# Patient Record
Sex: MALE | Race: WHITE | Employment: OTHER | URBAN - METROPOLITAN AREA
[De-identification: names, ages, dates, MRNs, and addresses within clinical notes are randomized per-mention and may not be internally consistent; named-entity substitution may affect disease eponyms.]

---

## 2017-01-13 ENCOUNTER — PROCEDURE VISIT (OUTPATIENT)
Dept: DERMATOLOGY | Age: 72
End: 2017-01-13

## 2017-01-13 VITALS — DIASTOLIC BLOOD PRESSURE: 76 MMHG | SYSTOLIC BLOOD PRESSURE: 121 MMHG | HEART RATE: 52 BPM

## 2017-01-13 DIAGNOSIS — C44.712 BCC (BASAL CELL CARCINOMA), LEG, RIGHT: ICD-10-CM

## 2017-01-13 DIAGNOSIS — C44.519 BCC (BASAL CELL CARCINOMA), TRUNK: Primary | ICD-10-CM

## 2017-01-13 PROCEDURE — 12032 INTMD RPR S/A/T/EXT 2.6-7.5: CPT | Performed by: DERMATOLOGY

## 2017-01-13 PROCEDURE — 11603 EXC TR-EXT MAL+MARG 2.1-3 CM: CPT | Performed by: DERMATOLOGY

## 2017-01-13 PROCEDURE — 17262 DSTRJ MAL LES T/A/L 1.1-2.0: CPT | Performed by: DERMATOLOGY

## 2017-01-18 ENCOUNTER — TELEPHONE (OUTPATIENT)
Dept: DERMATOLOGY | Age: 72
End: 2017-01-18

## 2017-01-27 ENCOUNTER — NURSE ONLY (OUTPATIENT)
Dept: DERMATOLOGY | Age: 72
End: 2017-01-27

## 2017-01-27 DIAGNOSIS — Z48.02 VISIT FOR SUTURE REMOVAL: Primary | ICD-10-CM

## 2017-02-27 ENCOUNTER — OFFICE VISIT (OUTPATIENT)
Dept: DERMATOLOGY | Age: 72
End: 2017-02-27

## 2017-02-27 DIAGNOSIS — L57.0 AK (ACTINIC KERATOSIS): Primary | ICD-10-CM

## 2017-02-27 DIAGNOSIS — L11.1 GROVER'S DISEASE: ICD-10-CM

## 2017-02-27 DIAGNOSIS — D48.5 NEOPLASM OF UNCERTAIN BEHAVIOR OF SKIN: ICD-10-CM

## 2017-02-27 PROCEDURE — 99213 OFFICE O/P EST LOW 20 MIN: CPT | Performed by: DERMATOLOGY

## 2017-02-27 PROCEDURE — 11101 PR BIOPSY, EACH ADDED LESION: CPT | Performed by: DERMATOLOGY

## 2017-02-27 PROCEDURE — 17000 DESTRUCT PREMALG LESION: CPT | Performed by: DERMATOLOGY

## 2017-02-27 PROCEDURE — 17003 DESTRUCT PREMALG LES 2-14: CPT | Performed by: DERMATOLOGY

## 2017-02-27 PROCEDURE — 11100 PR BIOPSY OF SKIN LESION: CPT | Performed by: DERMATOLOGY

## 2017-03-02 ENCOUNTER — TELEPHONE (OUTPATIENT)
Dept: DERMATOLOGY | Age: 72
End: 2017-03-02

## 2017-03-02 DIAGNOSIS — C44.319 BASAL CELL CARCINOMA OF FOREHEAD: Primary | ICD-10-CM

## 2017-03-28 ENCOUNTER — PROCEDURE VISIT (OUTPATIENT)
Dept: SURGERY | Age: 72
End: 2017-03-28

## 2017-03-28 VITALS
WEIGHT: 235 LBS | HEART RATE: 60 BPM | TEMPERATURE: 98.3 F | SYSTOLIC BLOOD PRESSURE: 130 MMHG | DIASTOLIC BLOOD PRESSURE: 83 MMHG | OXYGEN SATURATION: 94 %

## 2017-03-28 DIAGNOSIS — C44.319 BASAL CELL CARCINOMA OF RIGHT FOREHEAD: Primary | ICD-10-CM

## 2017-03-28 DIAGNOSIS — C44.319 BASAL CELL CARCINOMA OF RIGHT FOREHEAD: ICD-10-CM

## 2017-03-28 PROCEDURE — 13132 CMPLX RPR F/C/C/M/N/AX/G/H/F: CPT | Performed by: DERMATOLOGY

## 2017-03-28 PROCEDURE — 17311 MOHS 1 STAGE H/N/HF/G: CPT | Performed by: DERMATOLOGY

## 2017-03-29 ENCOUNTER — TELEPHONE (OUTPATIENT)
Dept: SURGERY | Age: 72
End: 2017-03-29

## 2017-09-12 ENCOUNTER — OFFICE VISIT (OUTPATIENT)
Dept: DERMATOLOGY | Age: 72
End: 2017-09-12

## 2017-09-12 DIAGNOSIS — D48.5 NEOPLASM OF UNCERTAIN BEHAVIOR OF SKIN: ICD-10-CM

## 2017-09-12 DIAGNOSIS — L82.1 SK (SEBORRHEIC KERATOSIS): ICD-10-CM

## 2017-09-12 DIAGNOSIS — Z85.828 HISTORY OF NONMELANOMA SKIN CANCER: ICD-10-CM

## 2017-09-12 DIAGNOSIS — L57.0 AK (ACTINIC KERATOSIS): Primary | ICD-10-CM

## 2017-09-12 PROCEDURE — 17003 DESTRUCT PREMALG LES 2-14: CPT | Performed by: DERMATOLOGY

## 2017-09-12 PROCEDURE — 17000 DESTRUCT PREMALG LESION: CPT | Performed by: DERMATOLOGY

## 2017-09-12 PROCEDURE — 11100 PR BIOPSY OF SKIN LESION: CPT | Performed by: DERMATOLOGY

## 2017-09-12 PROCEDURE — 99213 OFFICE O/P EST LOW 20 MIN: CPT | Performed by: DERMATOLOGY

## 2017-09-12 PROCEDURE — 11101 PR BIOPSY, EACH ADDED LESION: CPT | Performed by: DERMATOLOGY

## 2017-09-15 ENCOUNTER — TELEPHONE (OUTPATIENT)
Dept: DERMATOLOGY | Age: 72
End: 2017-09-15

## 2017-10-06 ENCOUNTER — OFFICE VISIT (OUTPATIENT)
Dept: DERMATOLOGY | Age: 72
End: 2017-10-06

## 2017-10-06 DIAGNOSIS — C44.719 BCC (BASAL CELL CARCINOMA), LEG, LEFT: ICD-10-CM

## 2017-10-06 DIAGNOSIS — C44.519 BCC (BASAL CELL CARCINOMA), TRUNK: Primary | ICD-10-CM

## 2017-10-06 PROCEDURE — 17261 DSTRJ MAL LES T/A/L .6-1.0CM: CPT | Performed by: DERMATOLOGY

## 2017-10-06 NOTE — PATIENT INSTRUCTIONS
Wound Care Instructions    · Keep the bandage in place for 24 hours. · Cleanse the wound with mild soapy water daily   Gently dry the area.  Apply Vaseline or petroleum jelly to the wound using a cotton tipped applicator.  Cover with a clean bandage.  Repeat this process until the site is healed.    You may shower and bathe as usual.

## 2017-10-06 NOTE — PROGRESS NOTES
[facility] Dermatology  Kimberlee Gupta MD  [phone]      Zayra Renteria  1945    67 y.o. male     Date of Visit: 10/6/2017    Chief Complaint: BCCs    History of Present Illness:    Here today for treatment of 2 superficial basal cell carcinomas on the right lower back and left medial lower leg. Review of Systems:  None. Past Medical History, Family History, Surgical History, Medications and Allergies reviewed. Past Medical History:   Diagnosis Date    Hyperlipidemia     Hypertension      Past Surgical History:   Procedure Laterality Date    FRACTURE SURGERY      wrist       No Known Allergies  Outpatient Prescriptions Marked as Taking for the 10/6/17 encounter (Office Visit) with Justin Oseguera MD   Medication Sig Dispense Refill    atorvastatin (LIPITOR) 20 MG tablet       lisinopril (PRINIVIL;ZESTRIL) 10 MG tablet       aspirin 81 MG tablet Take 81 mg by mouth daily           Physical Examination       Well appearing. 1.  Right lower back - 7 mm oval shaped pink macule. 2.  Left medial lower leg - 1 cm oval shaped pink patch. Assessment and Plan     1. BCC (basal cell carcinoma), right lower back - 7 mm    The area to be treated with cleansed with alcohol and marked with surgical marking pen. Local anesthesia was acheived with 1% lidocaine with epinephrine. Sharp curettage was performed in 3 different directions. Hemostasis was obtained with aluminum chloride. The wound was dressed with petrolatum and a bandage. Patient was educated regarding risks including bleeding, discomfort, and risk of recurrence. 2. BCC (basal cell carcinoma), left medial lower leg - 1 cm    The area to be treated with cleansed with alcohol and marked with surgical marking pen. Local anesthesia was acheived with 1% lidocaine with epinephrine. Sharp curettage was performed in 3 different directions. Hemostasis was obtained with aluminum chloride.  The wound was dressed with petrolatum and a bandage. Patient was educated regarding risks including bleeding, discomfort, and risk of recurrence.

## 2017-10-06 NOTE — MR AVS SNAPSHOT
After Visit Summary             Miriam Spurling   10/6/2017 8:45 AM   Office Visit    Description:  Male : 1945   Provider:  Alecia Pittman MD   Department:  [facility]              Your Follow-Up and Future Appointments         Below is a list of your follow-up and future appointments. This may not be a complete list as you may have made appointments directly with providers that we are not aware of or your providers may have made some for you. Please call your providers to confirm appointments. It is important to keep your appointments. Please bring your current insurance card, photo ID, co-pay, and all medication bottles to your appointment. If self-pay, payment is expected at the time of service. Information from Your Visit        Department     Name Address Phone Fax    [facility] [address] [address] [phone]      You Were Seen for:         Comments    BCC (basal cell carcinoma), trunk   [[id number]]         Vital Signs     Smoking Status                   Never Smoker           Instructions    Wound Care Instructions    · Keep the bandage in place for 24 hours. · Cleanse the wound with mild soapy water daily  ? Gently dry the area. ? Apply Vaseline or petroleum jelly to the wound using a cotton tipped applicator. ? Cover with a clean bandage. ? Repeat this process until the site is healed. ? You may shower and bathe as usual.                 Medications and Orders      Your Current Medications Are              atorvastatin (LIPITOR) 20 MG tablet     lisinopril (PRINIVIL;ZESTRIL) 10 MG tablet     aspirin 81 MG tablet Take 81 mg by mouth daily    fluorouracil (EFUDEX) 5 % cream Apply twice daily to the forehead and top of the scalp for 14 days.       Allergies           No Known Allergies         Additional Information        Basic Information     Date Of Birth Sex Race Ethnicity Preferred Language

## 2018-04-26 ENCOUNTER — OFFICE VISIT (OUTPATIENT)
Dept: DERMATOLOGY | Age: 73
End: 2018-04-26

## 2018-04-26 DIAGNOSIS — L57.0 AK (ACTINIC KERATOSIS): ICD-10-CM

## 2018-04-26 DIAGNOSIS — L82.1 SK (SEBORRHEIC KERATOSIS): Primary | ICD-10-CM

## 2018-04-26 DIAGNOSIS — Z85.828 HISTORY OF BASAL CELL CARCINOMA: ICD-10-CM

## 2018-04-26 PROCEDURE — 4040F PNEUMOC VAC/ADMIN/RCVD: CPT | Performed by: DERMATOLOGY

## 2018-04-26 PROCEDURE — G8427 DOCREV CUR MEDS BY ELIG CLIN: HCPCS | Performed by: DERMATOLOGY

## 2018-04-26 PROCEDURE — 1123F ACP DISCUSS/DSCN MKR DOCD: CPT | Performed by: DERMATOLOGY

## 2018-04-26 PROCEDURE — 99213 OFFICE O/P EST LOW 20 MIN: CPT | Performed by: DERMATOLOGY

## 2018-04-26 PROCEDURE — 1036F TOBACCO NON-USER: CPT | Performed by: DERMATOLOGY

## 2018-04-26 PROCEDURE — G8421 BMI NOT CALCULATED: HCPCS | Performed by: DERMATOLOGY

## 2018-04-26 PROCEDURE — 3017F COLORECTAL CA SCREEN DOC REV: CPT | Performed by: DERMATOLOGY

## 2018-04-26 PROCEDURE — 17000 DESTRUCT PREMALG LESION: CPT | Performed by: DERMATOLOGY

## 2018-04-26 PROCEDURE — 17003 DESTRUCT PREMALG LES 2-14: CPT | Performed by: DERMATOLOGY

## 2018-11-06 ENCOUNTER — OFFICE VISIT (OUTPATIENT)
Dept: DERMATOLOGY | Age: 73
End: 2018-11-06
Payer: MEDICARE

## 2018-11-06 DIAGNOSIS — D48.5 NEOPLASM OF UNCERTAIN BEHAVIOR OF SKIN: ICD-10-CM

## 2018-11-06 DIAGNOSIS — L57.0 AK (ACTINIC KERATOSIS): Primary | ICD-10-CM

## 2018-11-06 PROCEDURE — 11100 PR BIOPSY OF SKIN LESION: CPT | Performed by: DERMATOLOGY

## 2018-11-06 PROCEDURE — 17000 DESTRUCT PREMALG LESION: CPT | Performed by: DERMATOLOGY

## 2018-11-06 PROCEDURE — 11101 PR BIOPSY, EACH ADDED LESION: CPT | Performed by: DERMATOLOGY

## 2018-11-06 PROCEDURE — 17003 DESTRUCT PREMALG LES 2-14: CPT | Performed by: DERMATOLOGY

## 2018-11-08 LAB — DERMATOLOGY PATHOLOGY REPORT: ABNORMAL

## 2018-12-07 ENCOUNTER — OFFICE VISIT (OUTPATIENT)
Dept: DERMATOLOGY | Age: 73
End: 2018-12-07
Payer: MEDICARE

## 2018-12-07 DIAGNOSIS — C44.519 BCC (BASAL CELL CARCINOMA), TRUNK: ICD-10-CM

## 2018-12-07 DIAGNOSIS — D04.5 CARCINOMA IN SITU OF SKIN OF TRUNK: Primary | ICD-10-CM

## 2018-12-07 PROCEDURE — 17262 DSTRJ MAL LES T/A/L 1.1-2.0: CPT | Performed by: DERMATOLOGY

## 2018-12-07 PROCEDURE — 17261 DSTRJ MAL LES T/A/L .6-1.0CM: CPT | Performed by: DERMATOLOGY

## 2018-12-07 NOTE — PROGRESS NOTES
was acheived with 1% lidocaine with epinephrine. Sharp curettage was performed in 3 different directions. Hemostasis was obtained with aluminum chloride. The wound was dressed with petrolatum and a bandage. Patient was educated regarding risks including bleeding, discomfort, and risk of recurrence.

## 2019-05-09 ENCOUNTER — OFFICE VISIT (OUTPATIENT)
Dept: DERMATOLOGY | Age: 74
End: 2019-05-09
Payer: MEDICARE

## 2019-05-09 DIAGNOSIS — L57.0 ACTINIC KERATOSIS: ICD-10-CM

## 2019-05-09 DIAGNOSIS — Z85.828 HISTORY OF NONMELANOMA SKIN CANCER: ICD-10-CM

## 2019-05-09 DIAGNOSIS — L82.1 SK (SEBORRHEIC KERATOSIS): Primary | ICD-10-CM

## 2019-05-09 PROCEDURE — 17000 DESTRUCT PREMALG LESION: CPT | Performed by: DERMATOLOGY

## 2019-05-09 PROCEDURE — 17003 DESTRUCT PREMALG LES 2-14: CPT | Performed by: DERMATOLOGY

## 2019-05-09 PROCEDURE — 1123F ACP DISCUSS/DSCN MKR DOCD: CPT | Performed by: DERMATOLOGY

## 2019-05-09 PROCEDURE — 4040F PNEUMOC VAC/ADMIN/RCVD: CPT | Performed by: DERMATOLOGY

## 2019-05-09 PROCEDURE — G8421 BMI NOT CALCULATED: HCPCS | Performed by: DERMATOLOGY

## 2019-05-09 PROCEDURE — 3017F COLORECTAL CA SCREEN DOC REV: CPT | Performed by: DERMATOLOGY

## 2019-05-09 PROCEDURE — 99213 OFFICE O/P EST LOW 20 MIN: CPT | Performed by: DERMATOLOGY

## 2019-05-09 PROCEDURE — G8427 DOCREV CUR MEDS BY ELIG CLIN: HCPCS | Performed by: DERMATOLOGY

## 2019-05-09 PROCEDURE — 1036F TOBACCO NON-USER: CPT | Performed by: DERMATOLOGY

## 2019-05-09 NOTE — PATIENT INSTRUCTIONS
Cryosurgery (Freezing) Wound Care Instructions    AFTER THE PROCEDURE:    You will notice swelling and redness around the site. This is normal.    You may experience a sharp or sore feeling for the next several days. For this discomfort, you may take acetaminophen (Tylenol©).  A blister may develop at the treated area, sometimes as soon as by the end of the day. After several days, the blister will subside and a scab will form.  If the area is bumped or traumatized during the first few days following freezing, you may develop bleeding into the blister, forming a blood blister. This is nothing to be alarmed about.  If the blister is tense, uncomfortable, or much larger than the site that was frozen, you may pop the blister along its edge with a sterile needle (boiled, heated under a flame, or cleaned with alcohol) to allow the fluid to drain out. If the blister does not bother you, no treatment is needed.  Do NOT peel off the top of the blister roof. It will act as a dressing on top of your wound. WOUND CARE:    You may shower or bathe as usual, but avoid scrubbing the areas that have been frozen.  Cleanse the site twice a day with mild soapy water, and then apply a thin film of white petrolatum (Vaseline©).  You do not need to cover the area, but can if you prefer.  Do NOT allow the site to become dry or crusted, or attempt to dry it out with rubbing alcohol or hydrogen peroxide.  Continue this regimen until the area is pink and healed. Depending on the size and location of your cryosurgery site, healing may take 2 to 4 weeks.  The area may continue to be pink for several weeks, and over the next few months may become darker or lighter than the surrounding skin. This may be a permanent change.    

## 2019-05-09 NOTE — PROGRESS NOTES
[facility]  Esdras Pinto MD  [phone]      Starla Chandra  1945    75 y.o. male     Date of Visit: 5/9/2019    Chief Complaint: skin lesions    History of Present Illness:    1. He complains of an asymptomatic lesion below the right eye.     2.  F/u for a hx of AKs - has several new lesions today on the scalp and face. 3.  Has hx of skin cancers - denies any signs of recurrence. 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 Superficial BCC on the right lower back and left medial lower leg both treated with curettage on 10/6/2017. Infiltrating BCC of the right central forehead-treated with Mohs by Dr. Libertad Villa on 3/28/2017. Nodular BCC of the right upper forehead-treated with Mohs by Dr. Libertad Villa on 3/28/2017.     Nodular BCC of the right mid upper back-excised on 1/13/2017. Superficial BCC of the right distal posterior thigh-treated with curettage on 1/13/2017. Nodular BCC of the right lower shin-treated with curettage on 1/13/2017. Basal cell carcinoma of the left leg-excised in August 2015. 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 Nodular BCC of the left leg-excised in 2012. Review of Systems:  Skin: No new or changing moles. Past Medical History, Family History, Surgical History, Medications and Allergies reviewed.     Past Medical History:   Diagnosis Date    Hyperlipidemia     Hypertension      Past Surgical History:   Procedure Laterality Date    FRACTURE SURGERY      wrist       No Known Allergies  Outpatient Medications Marked as Taking for the 5/9/19 encounter (Office Visit) with Nicolette Whitaker MD   Medication Sig Dispense Refill    atorvastatin (LIPITOR) 20 MG tablet       lisinopril (PRINIVIL;ZESTRIL) 10 MG tablet       aspirin 81 MG tablet Take 81 mg by mouth daily      fluorouracil (EFUDEX) 5 % cream Apply twice

## 2019-11-12 ENCOUNTER — OFFICE VISIT (OUTPATIENT)
Dept: DERMATOLOGY | Age: 74
End: 2019-11-12
Payer: MEDICARE

## 2019-11-12 DIAGNOSIS — L57.0 ACTINIC KERATOSIS: Primary | ICD-10-CM

## 2019-11-12 DIAGNOSIS — D48.5 NEOPLASM OF UNCERTAIN BEHAVIOR OF SKIN: ICD-10-CM

## 2019-11-12 PROCEDURE — 11102 TANGNTL BX SKIN SINGLE LES: CPT | Performed by: DERMATOLOGY

## 2019-11-12 PROCEDURE — 11103 TANGNTL BX SKIN EA SEP/ADDL: CPT | Performed by: DERMATOLOGY

## 2019-11-12 PROCEDURE — 17003 DESTRUCT PREMALG LES 2-14: CPT | Performed by: DERMATOLOGY

## 2019-11-12 PROCEDURE — 17000 DESTRUCT PREMALG LESION: CPT | Performed by: DERMATOLOGY

## 2019-11-14 LAB — DERMATOLOGY PATHOLOGY REPORT: ABNORMAL

## 2019-11-15 ENCOUNTER — TELEPHONE (OUTPATIENT)
Dept: DERMATOLOGY | Age: 74
End: 2019-11-15

## 2019-11-15 DIAGNOSIS — C44.42 SQUAMOUS CELL CARCINOMA OF SCALP: Primary | ICD-10-CM

## 2019-11-22 ENCOUNTER — PROCEDURE VISIT (OUTPATIENT)
Dept: DERMATOLOGY | Age: 74
End: 2019-11-22
Payer: MEDICARE

## 2019-11-22 DIAGNOSIS — C44.719 BCC (BASAL CELL CARCINOMA), LEG, LEFT: Primary | ICD-10-CM

## 2019-11-22 PROCEDURE — 11603 EXC TR-EXT MAL+MARG 2.1-3 CM: CPT | Performed by: DERMATOLOGY

## 2019-11-22 PROCEDURE — 12032 INTMD RPR S/A/T/EXT 2.6-7.5: CPT | Performed by: DERMATOLOGY

## 2019-11-26 LAB — DERMATOLOGY PATHOLOGY REPORT: ABNORMAL

## 2019-12-04 ENCOUNTER — PROCEDURE VISIT (OUTPATIENT)
Dept: SURGERY | Age: 74
End: 2019-12-04
Payer: MEDICARE

## 2019-12-04 VITALS
WEIGHT: 219 LBS | TEMPERATURE: 98 F | SYSTOLIC BLOOD PRESSURE: 113 MMHG | DIASTOLIC BLOOD PRESSURE: 69 MMHG | OXYGEN SATURATION: 97 % | HEART RATE: 65 BPM

## 2019-12-04 DIAGNOSIS — C44.42 SQUAMOUS CELL CARCINOMA OF SCALP: Primary | ICD-10-CM

## 2019-12-04 PROCEDURE — 17311 MOHS 1 STAGE H/N/HF/G: CPT | Performed by: DERMATOLOGY

## 2019-12-04 PROCEDURE — 12032 INTMD RPR S/A/T/EXT 2.6-7.5: CPT | Performed by: DERMATOLOGY

## 2019-12-05 ENCOUNTER — TELEPHONE (OUTPATIENT)
Dept: SURGERY | Age: 74
End: 2019-12-05

## 2019-12-23 ENCOUNTER — NURSE ONLY (OUTPATIENT)
Dept: SURGERY | Age: 74
End: 2019-12-23

## 2019-12-23 DIAGNOSIS — Z48.02 VISIT FOR SUTURE REMOVAL: Primary | ICD-10-CM

## 2020-09-24 ENCOUNTER — OFFICE VISIT (OUTPATIENT)
Dept: DERMATOLOGY | Age: 75
End: 2020-09-24
Payer: MEDICARE

## 2020-09-24 VITALS — TEMPERATURE: 98.4 F

## 2020-09-24 PROCEDURE — 99213 OFFICE O/P EST LOW 20 MIN: CPT | Performed by: DERMATOLOGY

## 2020-09-24 PROCEDURE — 17003 DESTRUCT PREMALG LES 2-14: CPT | Performed by: DERMATOLOGY

## 2020-09-24 PROCEDURE — 1036F TOBACCO NON-USER: CPT | Performed by: DERMATOLOGY

## 2020-09-24 PROCEDURE — 4040F PNEUMOC VAC/ADMIN/RCVD: CPT | Performed by: DERMATOLOGY

## 2020-09-24 PROCEDURE — G8421 BMI NOT CALCULATED: HCPCS | Performed by: DERMATOLOGY

## 2020-09-24 PROCEDURE — 17000 DESTRUCT PREMALG LESION: CPT | Performed by: DERMATOLOGY

## 2020-09-24 PROCEDURE — G8427 DOCREV CUR MEDS BY ELIG CLIN: HCPCS | Performed by: DERMATOLOGY

## 2020-09-24 PROCEDURE — 1123F ACP DISCUSS/DSCN MKR DOCD: CPT | Performed by: DERMATOLOGY

## 2020-09-24 NOTE — PROGRESS NOTES
[facility]  Jeannette Mcbride MD  [phone]      Luis Escoto  1945    67 y.o. male     Date of Visit: 9/24/2020    Chief Complaint: skin lesions, follow up for skin cancer    History of Present Illness:    1. He has a history of actinic Sebastian Solo has new lesions on the scalp and upper extremities. 2.  He complains of an asymptomatic lesion on the left calf. 3.  He has a history of multiple nonmelanoma skin cancers-denies any signs of recurrence. SCC of the vertex scalp-treated with Mohs by Dr. Stephanie Loja on 12/4/2019. Nodular BCC of the left superior lateral leg-excised 11/22/2019. 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 Superficial BCC on the right lower back and left medial lower leg both treated with curettage on 10/6/2017. Infiltrating BCC of the right central forehead-treated with Mohs by Dr. Stephanie Loja on 3/28/2017. Nodular BCC of the right upper forehead-treated with Mohs by Dr. Stephanie Loja on 3/28/2017.     Nodular BCC of the right mid upper back-excised on 1/13/2017. Superficial BCC of the right distal posterior thigh-treated with curettage on 1/13/2017. Nodular BCC of the right lower shin-treated with curettage on 1/13/2017. Basal cell carcinoma of the left leg-excised in August 2015. 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 Nodular BCC of the left leg-excised in 2012.     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 Review of Systems:  Skin: No new or changing moles. Past Medical History, Family History, Surgical History, Medications and Allergies reviewed.     Past Medical History:   Diagnosis Date    Hyperlipidemia     Hypertension      Past Surgical History:   Procedure Laterality Date    FRACTURE SURGERY      wrist    MOHS SURGERY  12/04/2019    Scalp    TOTAL HIP ARTHROPLASTY Left 08/2019       No Known

## 2021-04-06 ENCOUNTER — OFFICE VISIT (OUTPATIENT)
Dept: DERMATOLOGY | Age: 76
End: 2021-04-06
Payer: MEDICARE

## 2021-04-06 VITALS — TEMPERATURE: 98.2 F

## 2021-04-06 DIAGNOSIS — L57.0 ACTINIC KERATOSIS: Primary | ICD-10-CM

## 2021-04-06 DIAGNOSIS — D48.5 NEOPLASM OF UNCERTAIN BEHAVIOR OF SKIN: ICD-10-CM

## 2021-04-06 DIAGNOSIS — L82.0 INFLAMED SEBORRHEIC KERATOSIS: ICD-10-CM

## 2021-04-06 PROCEDURE — 11102 TANGNTL BX SKIN SINGLE LES: CPT | Performed by: DERMATOLOGY

## 2021-04-06 PROCEDURE — 11103 TANGNTL BX SKIN EA SEP/ADDL: CPT | Performed by: DERMATOLOGY

## 2021-04-06 PROCEDURE — 17003 DESTRUCT PREMALG LES 2-14: CPT | Performed by: DERMATOLOGY

## 2021-04-06 PROCEDURE — 17110 DESTRUCTION B9 LES UP TO 14: CPT | Performed by: DERMATOLOGY

## 2021-04-06 PROCEDURE — 17000 DESTRUCT PREMALG LESION: CPT | Performed by: DERMATOLOGY

## 2021-04-06 NOTE — PROGRESS NOTES
[facility] Dermatology  Grecia Drake MD  [phone]      Maegan Austin  1945    67 y.o. male     Date of Visit: 4/6/2021    Chief Complaint: skin lesions    History of Present Illness:    1. Follow-up for history of AK's-complains of few new scaly lesions on the scalp and face. 2.  He also complains of some pruritic lesions on the lower central neck, chest and right side of the neck. 3.  Unknown duration of persistent pink lesions on the back, left cheek and left leg. Dermatologic history:     SCC of the vertex scalp-treated with Mohs by Dr. Sofy Canales on 12/4/2019. Nodular BCC of the left superior lateral leg-excised 11/22/2019. 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 Superficial BCC on the right lower back and left medial lower leg both treated with curettage on 10/6/2017. Infiltrating BCC of the right central forehead-treated with Mohs by Dr. Sofy Canales on 3/28/2017. Nodular BCC of the right upper forehead-treated with Mohs by Dr. Sofy Canales on 3/28/2017.     Nodular BCC of the right mid upper back-excised on 1/13/2017. Superficial BCC of the right distal posterior thigh-treated with curettage on 1/13/2017. Nodular BCC of the right lower shin-treated with curettage on 1/13/2017. Basal cell carcinoma of the left leg-excised in August 2015. 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 Nodular BCC of the left leg-excised in 2012.     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 Review of Systems:  Gen: Feels well, good sense of health. Past Medical History, Family History, Surgical History, Medications and Allergies reviewed.     Past Medical History:   Diagnosis Date    Hyperlipidemia     Hypertension      Past Surgical History:   Procedure Laterality Date    FRACTURE SURGERY      wrist    MOHS SURGERY  12/04/2019    Scalp    TOTAL HIP ARTHROPLASTY Left 08/2019       No Known Allergies  Outpatient Medications Marked as Taking for the 4/6/21 encounter (Office Visit) with Sanya Jaime MD   Medication Sig Dispense Refill    carbidopa-levodopa (SINEMET)  MG per tablet TAKE ONE (1) TABLET BY MOUTH EVERY 24 HOURS      atorvastatin (LIPITOR) 20 MG tablet       lisinopril (PRINIVIL;ZESTRIL) 10 MG tablet            Physical Examination       The following were examined and determined to be normal: Psych/Neuro, Conjunctivae/eyelids, Gums/teeth/lips, Neck, Abdomen, RUE, LUE, RLE and Nails/digits. The following were examined and determined to be abnormal: Scalp/hair, Head/face, Breast/axilla/chest, Back and LLE. Well-appearing. 1.  Frontal scalp-2, right medial cheek-1: Few keratotic erythematous macules. 2.  Central lower neck, right postauricular region and left central chest with several stuck on appearing verrucous crusted erythematous brown plaques. 3.  A.  Right upper back with a 1.3 cm oval-shaped telangiectatic pink patch. B.  Left medial cheek with a 4 mm telangiectatic pearly papule. C.  Left mid leg with a nearly 1 cm round erythematous macule. Assessment and Plan     1. Actinic keratoses     2 cycles of liquid nitrogen applied to 3 AKs:  Frontal scalp-2, right medial cheek-1. Patient was educated regarding the potential risks of blister formation, discomfort, hypopigmentation, and scar. Wound care was discussed. 2. Inflamed seborrheic keratosis     2 cycles of liquid nitrogen applied to 3 ISKs: Central lower neck, right postauricular region and left central chest. Patient was educated regarding the potential risks of blister formation, discomfort, hypopigmentation, and scar. Wound care was discussed. 3. Neoplasm of uncertain behavior of skin,  A. Right upper back-rule out [address]  B. Left medial cheek-rule out [address]  C.   Left mid leg-rule out BCC    Discussed possible diagnosis; patient agreeable to biopsies (verbal consent obtained). The area(s) to be biopsied were marked with a surgical pen. Alcohol was used to cleanse the site. Local anesthesia was acheived with 1% lidocaine with epinephrine. Shave biopsy was performed x 3 using a razor blade. Hemostasis was achieved with aluminum chloride. The wound(s) were dressed with petrolatum and covered with a bandage. Wound care instructions were reviewed. 3 Specimen (s) sent to pathology. The specimen bottles were appropriately labeled. We also reviewed the risks of bleeding, scar, and infection. Return in about 6 months (around 10/6/2021).     --Fareed Reynolds MD

## 2021-04-08 LAB — DERMATOLOGY PATHOLOGY REPORT: ABNORMAL

## 2021-04-12 ENCOUNTER — TELEPHONE (OUTPATIENT)
Dept: DERMATOLOGY | Age: 76
End: 2021-04-12

## 2021-04-12 DIAGNOSIS — C44.319 BASAL CELL CARCINOMA (BCC) OF LEFT MEDIAL CHEEK: Primary | ICD-10-CM

## 2021-04-12 NOTE — TELEPHONE ENCOUNTER
Informed patient of biopsy results. Will place referral for Moh's surgery to Dr. Nedra Car. Pt scheduled for excision with Dr. Yonathan Smith on 4/23/21 at 9:30am.  Patient verbalized understanding.

## 2021-04-12 NOTE — TELEPHONE ENCOUNTER
Pt /b [id number]  Pt states:   - returning call for biopsy results  Please call to discuss thanks

## 2021-04-23 ENCOUNTER — PROCEDURE VISIT (OUTPATIENT)
Dept: DERMATOLOGY | Age: 76
End: 2021-04-23
Payer: MEDICARE

## 2021-04-23 VITALS — TEMPERATURE: 98.1 F

## 2021-04-23 DIAGNOSIS — C44.519 BASAL CELL CARCINOMA (BCC) OF BACK: Primary | ICD-10-CM

## 2021-04-23 DIAGNOSIS — C44.719 BASAL CELL CARCINOMA (BCC) OF LEFT LOWER LEG: ICD-10-CM

## 2021-04-23 PROCEDURE — 17262 DSTRJ MAL LES T/A/L 1.1-2.0: CPT | Performed by: DERMATOLOGY

## 2021-04-23 PROCEDURE — 17261 DSTRJ MAL LES T/A/L .6-1.0CM: CPT | Performed by: DERMATOLOGY

## 2021-04-23 NOTE — PROGRESS NOTES
[facility] Dermatology  Pedro Vogel MD  [phone]      Kaitlynn Stable  1945    67 y.o. male     Date of Visit: 4/23/2021    Chief Complaint: skin cancers    History of Present Illness:    Here today for treatment of 2 skin cancers:    DIAGNOSIS:   A. Right upper back-   Basal Cell Carcinoma, Superficial Type        C. Left mid leg-   Basal Cell Carcinoma, nodular and superficial       RESULTS SIGNATURE   Reba Flores MD   Electronic Signature: 08 APR 2021 12:22 PM       Review of Systems:  Gen: Feels well, good sense of health. Past Medical History, Family History, Surgical History, Medications and Allergies reviewed. Past Medical History:   Diagnosis Date    Hyperlipidemia     Hypertension      Past Surgical History:   Procedure Laterality Date    FRACTURE SURGERY      wrist    MOHS SURGERY  12/04/2019    Scalp    TOTAL HIP ARTHROPLASTY Left 08/2019       No Known Allergies  Outpatient Medications Marked as Taking for the 4/23/21 encounter (Procedure visit) with Amberly Lo MD   Medication Sig Dispense Refill    carbidopa-levodopa (SINEMET)  MG per tablet TAKE ONE (1) TABLET BY MOUTH EVERY 24 HOURS      atorvastatin (LIPITOR) 20 MG tablet       lisinopril (PRINIVIL;ZESTRIL) 10 MG tablet       fluorouracil (EFUDEX) 5 % cream Apply twice daily to the forehead and top of the scalp for 14 days. 40 g 0           Physical Examination     Well appearing. 1.  Right upper back with a 1.2 cm oval shaped crusted erythematous patch. 2.  Left mid medial leg - 7 mm crusted erythematous papule. Assessment and Plan     1. Superficial basal cell carcinoma (BCC) of the right upper back -1.2 cm    The area to be treated with cleansed with alcohol and marked with surgical marking pen. Local anesthesia was acheived with 1% lidocaine with epinephrine. Sharp curettage was performed in 3 different directions. Hemostasis was obtained with aluminum chloride.  The wound was dressed with petrolatum and a bandage. Patient was educated regarding risks including bleeding, discomfort, and risk of recurrence. 2. Nodular and superficial basal cell carcinoma (BCC) of left lower leg - 7 mm    The area to be treated with cleansed with alcohol and marked with surgical marking pen. Local anesthesia was acheived with 1% lidocaine with epinephrine. Sharp curettage was performed in multiple directions followed 3 times by electrodessication. Hemostasis was obtained with aluminum chloride. The wound was dressed with petrolatum and a bandage. Patient was educated regarding risks including bleeding, discomfort, and risk of recurrence.            --Agata Rooney MD

## 2021-04-28 ENCOUNTER — PROCEDURE VISIT (OUTPATIENT)
Dept: SURGERY | Age: 76
End: 2021-04-28
Payer: MEDICARE

## 2021-04-28 VITALS — DIASTOLIC BLOOD PRESSURE: 58 MMHG | TEMPERATURE: 97.7 F | HEART RATE: 85 BPM | SYSTOLIC BLOOD PRESSURE: 106 MMHG

## 2021-04-28 DIAGNOSIS — C44.319 BASAL CELL CARCINOMA OF LEFT CHEEK: Primary | ICD-10-CM

## 2021-04-28 PROCEDURE — 12052 INTMD RPR FACE/MM 2.6-5.0 CM: CPT | Performed by: DERMATOLOGY

## 2021-04-28 PROCEDURE — 17311 MOHS 1 STAGE H/N/HF/G: CPT | Performed by: DERMATOLOGY

## 2021-04-28 NOTE — PATIENT INSTRUCTIONS
[facility] Office Hours:    Monday-Thursday  7:30 AM-4:30 PM    Friday  9:00 AM-1:00 PM    POST-OPERATIVE CARE FOR DISSOLVING STICHES             Bandage change after 48 hours    During your procedure today, dissolving sutures, or stiches, were used to close the wound. You do not have to have stiches removed. They will dissolve (melt away) on their own. Please follow these instructions to help you recover from your procedure and help your wound heal.    CARING FOR YOUR SURGICAL SITE  The bandage should remain on and completley dry for 48 hours. Do NOT get the bandage wet. 1. After the first 48 hours, gently remove the remaining part of the bandage. It can be helpful to moisten the bandage edges in the shower. Steri strips may still be on the wound. It is ok, they will fall off slowly with the daily bandage changes. 2. Gently clean AROUND the wound daily with mild soap and water. Please avoid the area from getting wet. The more moisture is on the sutures the quicker they will dissolve. 3. Dry (pat) the area with a clean Q-tip or gauze. Try to clean off any debris or crust from the area. 4. Apply a layer of Vaseline/ Aquaphor (or Bacitracin if your doctor recommends) to the wound area only. 5. Cut a piece of Telfa (or any non-stick dressing) to fit just over the wound and secure it with paper tape. If the wound is small you may use a Band- Aid. Keep area covered for a total of 1 week(s). If the dressing comes off or if you have questions, or concerns about the dressing, please call the office for instructions! POST OPERATIVE INSTRUCTIONS    1. Activity: Do not lift anything heavier than a gallon of milk for 1 week. Also, avoid strenuous activity such as running, power walking or contact sports. 2. Eating and drinking: Do not drink alcohol for 48 hours after your procedure. Alcohol increases the chances of bleeding.   3. Medicines   -If you have discomfort, take Acetaminophen (Tylenol or Extra Strength Tylenol). Follow the instructions and warning on the bottle. -If your doctor has prescribed you an Aspirin daily, please keep taking it. Do not take extra Aspirin or medicines containing Aspirin (such as Jcay-[place] and Excedrin) for 48 hours  after your procedure. Bleeding: If bleeding occurs, DO NOT remove the bandage. Put firm pressure on the area with gauze for 20 minutes without peeking. If the bleeding continue, apply pressure for another 20 minutes. If the bleeding does not stop after you apply pressure, call us right away. If you can not call, go to the nearest emergency room or urgent care facility. What to expect:  You may have these symptoms. They are normal and should get better with time:  1. Swelling. Swelling usually increases for the first 48 hours after your procedure and then begins to improve. Some soreness and redness around your wound. If we worked close to you eyes  (forehead, nose, temple, or upper cheeks) your eyes may become swollen and/ or black and blue. 2. Bruising, which could last 1 week or more. 3. Pink and bumpy appearance to the scar. This may happen a few weeks after your procedure. After 4 weeks, you may gently massage the area each day with facial moisturizer or petroleum jelly (Vaseline or Aquaphor). This will help to smooth the skin and improve the appearance of the scar. The color of your scar will fade over time, but may be pink for several months after the procedure. The scar may take 6 months to 1 year to reach its final color and appearance. 4. \"Spitting\" suture. Occasionally, an inside suture (stitch) does not completely dissolve. When this happens, (generally 4-8 weeks after surgery), it causes a bump or \"pimple\" to form on the scar. This is easily removed and is not at all serious. It does not mean the skin cancer has returned. Contact us if it happens, but do not be alarmed. Vitamin E oil is NOT necessary.  A good moisturizer is just as

## 2021-04-28 NOTE — PROGRESS NOTES
PRE-PROCEDURE SCREENING    Pacemaker/ICD: No  Difficulty with numbing in the past: No  Local Anesthesia Reaction/passing out: No  Latex or adhesive allergy:  No  Bleeding/Clotting Disorders: No  Anticoagulant Therapy: No  Joint prosthesis: Yes  - LT THR - 2018  Artificial Heart Valve: No  Stroke or Seizures: No  Organ Transplant or Lymphoma: No  Immunosuppression: No  Respiratory Problems: No

## 2021-04-28 NOTE — PROGRESS NOTES
MOHS PROCEDURE NOTE    PHYSICIAN:  Brent Almaguer. Moises Ortega MD    ASSISTANT: Mike Serrano RN     REFERRING PROVIDER:   Eloina Benson MD      PREOPERATIVE DIAGNOSIS: Superficial and nodular Basal Cell Carcinoma     SPECIFIC MOHS INDICATIONS:  location    AUC SCORIN/9    POSTOPERATIVE DIAGNOSIS: SAME    LOCATION: left medial cheek    OPERATIVE PROCEDURE:  MOHS MICROGRAPHIC SURGERY    RECONSTRUCTION OF DEFECT: Intermediate layered closure    PREOPERATIVE SIZE: 8x6 MM    DEFECT SIZE: 13x8 MM    LENGTH OF REPAIRED WOUND/SIZE OF FLAP/SIZE OF GRAFT:  28 MM    ANESTHESIA:  4mL 1% lidocaine with epinephrine 1:100,000 buffered. EBL:  MINIMAL    DURATION OF PROCEDURE:  1 HOURS    POSTOPERATIVE OBSERVATION: 1 HOUR    SPECIMENS:  SEE MOHS MAP    COMPLICATIONS:  NONE    DESCRIPTION OF PROCEDURE:  The patient was given a mirror, as appropriate, and the biopsy site was identified, marked with a surgical marking pen, and verified by the patient. Options for treatment were discussed and the patient was informed that Mohs surgery was the selected treatment based on its lower recurrence rate, given the features listed above, as compared to other treatment modalities such as excision, radiation, or curettage, and agreed with this treatment plan. Risks and benefits including bruising, swelling, bleeding, infection, nerve injury, recurrence, and scarring were discussed with the patient prior to the procedure and a written consent detailing these and other risks was reviewed with the patient and signed. There was a time out for person and procedure verification. The surgical site was prepped with an antiseptic solution. Application of an antiseptic solution was repeated before each surgical stage. Stage I:  The clinically-apparent tumor was carefully defined and debulked, determining the edge of the surgical excision.     A thin layer of tumor-laden tissue was excised with a narrow margin of normal-appearing skin, using the technique of Mohs. A map was prepared to correspond to the area of skin from which it was excised. Hemostasis was achieved using electrosurgery. The wound was bandaged. The tissue was prepared for the cryostat and sectioned. 1 section(s) prepared. Each section was coded, cut, and stained for microscopic examination. The entire base and margins of the excised piece of tissue were examined by the surgeon. The tissue was examined to the level of subcut fat. No tumor was identified at the peripheral margins of stage I of microscopically controlled surgery. DEFECT MANAGEMENT:    REPAIR DESCRIPTION:  Various closure modalities were discussed with the patient, and it was decided that an intermediate layered repair would best preserve normal anatomic and functional relationships. Additional risk of wound dehiscence was discussed. The area was anesthetized with 1% lidocaine with epinephrine 1:100,000 buffered, was given a sterile prep using Chlorhexidine gluconate 4% solution and draped in the usual sterile fashion. Recreation and enlargement of the wound was performed by excising cones of tissue via the triangulation technique. The final incision lines were placed with respect for the patient's natural skin tension lines in a linear configuration to avoid functional and aesthetic distortion of adjacent free margins. Following minimal undermining, meticulous hemostasis was obtained with spot monopolar electrocoagulation. Subcutaneous dead space and dermis were closed using 5-0 Vicryl buried subcutaneous interrupted suture and the epidermis was approximated with 5-0 Fast absorbing gut running epidermal sutures. WOUND COVERAGE:  The wound was cleaned with normal saline solution, dried off, Aquaphor ointment was applied, and the wound was covered. A dressing was applied for stabilization and light pressure.   The patient was given detailed oral and written instructions on postoperative care.       There were no complications. The patient left the Unit in good medical condition. FOLLOW-UP:  As dissolving sutures were placed, the patient was asked to return if any questions or concerns arose, but otherwise will return to see general dermatology per their instructions.

## 2021-04-29 ENCOUNTER — TELEPHONE (OUTPATIENT)
Dept: SURGERY | Age: 76
End: 2021-04-29

## 2021-04-29 NOTE — TELEPHONE ENCOUNTER
The patient was in the office on 4/28/2021 for Mohs located on the LT mid cheek with [facility] repair. The patient tolerated the procedure well and left the office in good condition. Pain level on post-operative day 1:  Pain yesterday  Level 3, took Tylenol and this resolved the pain. Woke up w/none today and no medication needed today. Any bleeding episode that required pressure to be held, bandage change or a call to the office or MD?  no     Any other issues?:  no    A post-operative telephone call was placed at 3:27p, 4/29/2021, in order to check on the patient's recovery process. The patient reported doing well and had no complaints other than those listed above, if any. All of the patient's questions were answered. Advised to call w/any questions/concerns.

## 2021-08-18 ENCOUNTER — OFFICE VISIT (OUTPATIENT)
Dept: DERMATOLOGY | Age: 76
End: 2021-08-18
Payer: MEDICARE

## 2021-08-18 VITALS — TEMPERATURE: 97 F

## 2021-08-18 DIAGNOSIS — D48.5 NEOPLASM OF UNCERTAIN BEHAVIOR OF SKIN: Primary | ICD-10-CM

## 2021-08-18 PROCEDURE — 11102 TANGNTL BX SKIN SINGLE LES: CPT | Performed by: DERMATOLOGY

## 2021-08-18 NOTE — PROGRESS NOTES
[facility] Dermat[facility]  Bhavana Subramanian MD  [phone]      Walter Sen  1945    67 y.o. male     Date of Visit: 8/18/2021    Chief Complaint: skin lesion    History of Present Illness:    He presents today for an enlarging tender nodule on the left forearm. Dermatologic history:    Superficial nodular BCC on the left medial cheek-treated with Mohs by Dr. Ceferino Becerra on 4/28/2021. Superficial BCC on the right upper back and nodular and superficial BCC on the left mid leg-treated with electrodesiccation and curettage on 4/23/2021. SCC of the vertex scalp-treated with Mohs by Dr. Ceferino Becerra on 12/4/2019. Nodular BCC of the left superior lateral leg-excised 11/22/2019. 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 Superficial BCC on the right lower back and left medial lower leg both treated with curettage on 10/6/2017. Infiltrating BCC of the right central forehead-treated with Mohs by Dr. Ceferino Becerra on 3/28/2017. Nodular BCC of the right upper forehead-treated with Mohs by Dr. Ceferino Becerra on 3/28/2017.     Nodular BCC of the right mid upper back-excised on 1/13/2017. Superficial BCC of the right distal posterior thigh-treated with curettage on 1/13/2017. Nodular BCC of the right lower shin-treated with curettage on 1/13/2017. Basal cell carcinoma of the left leg-excised in August 2015. 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 Nodular BCC of the left leg-excised in 2012.     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 Review of Systems:  Gen: Feels well, good sense of health. Past Medical History, Family History, Surgical History, Medications and Allergies reviewed.     Past Medical History:   Diagnosis Date    Hyperlipidemia     Hypertension     Tremor of left hand      Past Surgical History:   Procedure Laterality Date    BACK SURGERY  06/2020   

## 2021-08-20 LAB — DERMATOLOGY PATHOLOGY REPORT: ABNORMAL

## 2021-09-03 ENCOUNTER — PROCEDURE VISIT (OUTPATIENT)
Dept: DERMATOLOGY | Age: 76
End: 2021-09-03
Payer: MEDICARE

## 2021-09-03 VITALS — TEMPERATURE: 96.6 F

## 2021-09-03 DIAGNOSIS — C44.629 SQUAMOUS CELL CANCER OF SKIN OF LEFT FOREARM: Primary | ICD-10-CM

## 2021-09-03 PROCEDURE — 11603 EXC TR-EXT MAL+MARG 2.1-3 CM: CPT | Performed by: DERMATOLOGY

## 2021-09-03 PROCEDURE — 12032 INTMD RPR S/A/T/EXT 2.6-7.5: CPT | Performed by: DERMATOLOGY

## 2021-09-03 NOTE — PROGRESS NOTES
[facility]  Clara Araujo MD  [phone]      Nico Prado  1945    67 y.o. male     Date of Visit: 9/3/2021    Chief Complaint: SCC    History of Present Illness:    Here today for treatment of an SCC on the left forearm. RESULTS   DIAGNOSIS   DIAGNOSIS:     LEFT FOREARM-     Squamous cell carcinoma, moderately differentiated   RESULTS SIGNATURE   Calista Kerr. Mike Earl MD   Electronic Signature: 20 AUG 2021 11:56 AM       No blood thinners. No pacemaker. Review of Systems:  Gen: Feels well, good sense of health. Heme: No abnormal bruising or bleeding. Past Medical History, Family History, Surgical History, Medications and Allergies reviewed. Past Medical History:   Diagnosis Date    Hyperlipidemia     Hypertension     Tremor of left hand      Past Surgical History:   Procedure Laterality Date    BACK SURGERY  06/2020    FRACTURE SURGERY      wrist    MOHS SURGERY  12/04/2019    Scalp    TOTAL HIP ARTHROPLASTY Left 08/2019       No Known Allergies  Outpatient Medications Marked as Taking for the 9/3/21 encounter (Procedure visit) with Stew Hook MD   Medication Sig Dispense Refill    carbidopa-levodopa (SINEMET)  MG per tablet TAKE ONE (1) TABLET BY MOUTH EVERY 24 HOURS      atorvastatin (LIPITOR) 20 MG tablet       lisinopril (PRINIVIL;ZESTRIL) 10 MG tablet       fluorouracil (EFUDEX) 5 % cream Apply twice daily to the forehead and top of the scalp for 14 days. 40 g 0         Physical Examination       Well appearing. 1.  Left proximal extensor forearm - 1.2 cm eroded oval shaped biopsy site. Assessment and Plan     1. Squamous cell cancer of skin of left forearm     The site to be treated was confirmed with the patient and the previous note. The patient was educated regarding potential risks of bleeding, discomfort, scar, and recurrence. A consent form was signed by the patient.      The area was prepped and draped in the normal sterile fashion. Local anesthesia was achieved with 1% lidocaine with epinephrine. An elliptical excision was performed (width including margins = 2.2 cm) down to subcutaneous tissue. The specimen was submitted to pathology in an appropriately labeled specimen bottle. Pinpoint hemostasis was obtained. The wound edges were undermined and approximated in a layered fashion with buried sutures of 4-0 vicryl allowing for wound eversion and Dermabond to approximate the superficial dermis and epidermis. The final length of the wound measured 6 cm. Blood loss was minimal and there were no immediate complications. The wound was dressed with a pressure bandage.              --Amy Finnegan MD

## 2021-09-08 LAB — DERMATOLOGY PATHOLOGY REPORT: ABNORMAL

## 2021-10-18 ENCOUNTER — OFFICE VISIT (OUTPATIENT)
Dept: DERMATOLOGY | Age: 76
End: 2021-10-18
Payer: MEDICARE

## 2021-10-18 VITALS — TEMPERATURE: 97.4 F

## 2021-10-18 DIAGNOSIS — D48.5 NEOPLASM OF UNCERTAIN BEHAVIOR OF SKIN: ICD-10-CM

## 2021-10-18 DIAGNOSIS — L57.0 ACTINIC KERATOSIS: Primary | ICD-10-CM

## 2021-10-18 PROCEDURE — 17000 DESTRUCT PREMALG LESION: CPT | Performed by: DERMATOLOGY

## 2021-10-18 PROCEDURE — 11102 TANGNTL BX SKIN SINGLE LES: CPT | Performed by: DERMATOLOGY

## 2021-10-18 PROCEDURE — 17003 DESTRUCT PREMALG LES 2-14: CPT | Performed by: DERMATOLOGY

## 2021-10-18 NOTE — PROGRESS NOTES
[facility] Dermat[facility]  Clary Laboy MD  [phone]      Brianna Land  1945    67 y.o. male     Date of Visit: 10/18/2021    Chief Complaint: skin lesions    History of Present Illness:    1. Follow-up for history of AK's-has new lesions on the scalp and face today. 2.  Unknown duration of an asymptomatic lesion on the right lower forehead. Dermatologic history:    SCC on the left forearm-excised on 9/3/2021. Superficial nodular BCC on the left medial cheek-treated with Mohs by Dr. Abhijeet Ortiz on 4/28/2021. Superficial BCC on the right upper back and nodular and superficial BCC on the left mid leg-treated with electrodesiccation and curettage on 4/23/2021. SCC of the vertex scalp-treated with Mohs by Dr. Abhijeet Ortiz on 12/4/2019. Nodular BCC of the left superior lateral leg-excised 11/22/2019. 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 Superficial BCC on the right lower back and left medial lower leg both treated with curettage on 10/6/2017. Infiltrating BCC of the right central forehead-treated with Mohs by Dr. Abhijeet Ortiz on 3/28/2017. Nodular BCC of the right upper forehead-treated with Mohs by Dr. Abhijeet Ortiz on 3/28/2017.     Nodular BCC of the right mid upper back-excised on 1/13/2017. Superficial BCC of the right distal posterior thigh-treated with curettage on 1/13/2017. Nodular BCC of the right lower shin-treated with curettage on 1/13/2017. Basal cell carcinoma of the left leg-excised in August 2015. 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 Nodular BCC of the left leg-excised in 2012.     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 Review of Systems:  Gen: Feels well, good sense of health. Past Medical History, Family History, Surgical History, Medications and Allergies reviewed.     Past Medical History:   Diagnosis Date    Hyperlipidemia     Hypertension     Tremor of left hand      Past Surgical History:   Procedure Laterality Date    BACK SURGERY  06/2020    FRACTURE SURGERY      wrist    MOHS SURGERY  12/04/2019    Scalp    TOTAL HIP ARTHROPLASTY Left 08/2019       No Known Allergies  Outpatient Medications Marked as Taking for the 10/18/21 encounter (Office Visit) with Brandon Cheung MD   Medication Sig Dispense Refill    carbidopa-levodopa (SINEMET)  MG per tablet TAKE ONE (1) TABLET BY MOUTH EVERY 24 HOURS      atorvastatin (LIPITOR) 20 MG tablet       lisinopril (PRINIVIL;ZESTRIL) 10 MG tablet       fluorouracil (EFUDEX) 5 % cream Apply twice daily to the forehead and top of the scalp for 14 days. 40 g 0           Physical Examination       The following were examined and determined to be normal: Psych/Neuro, Conjunctivae/eyelids, Gums/teeth/lips, Neck, Breast/axilla/chest, Abdomen, Back, RUE, LUE, RLE, LLE and Nails/digits. The following were examined and determined to be abnormal: Scalp/hair and Head/face. Well-appearing. 1.  Vertex scalp-8, right superior helix-2, right medial cheek-1, nasal dorsum-2: Several keratotic erythematous macules and patches. 2.  Right lower forehead with a small ill-defined telangiectatic pearly papule. Assessment and Plan     1. Actinic keratoses - multiple    2 cycles of liquid nitrogen applied to 13 AKs: Vertex scalp-8, right superior helix-2, right medial cheek-1, nasal dorsum-2. Patient was educated regarding the potential risks of blister formation and discomfort. Wound care was discussed. 2. Neoplasm of uncertain behavior of skin, right lower forehead - r/o BCC    Discussed possible diagnosis; patient agreeable to biopsy (consent obtained). Risks reviewed including discomfort, bleeding, scar and infection. The area(s) to be biopsied were marked with a surgical pen. Alcohol was used to cleanse the site.  Local anesthesia was acheived with 1% lidocaine with epinephrine. Shave biopsy was performed using a razor blade. Hemostasis was achieved with aluminum chloride. The wound(s) were dressed with petrolatum and covered with a bandage. Wound care instructions were reviewed. 1 Specimen (s) sent to pathology. The specimen bottles were appropriately labeled. Return in about 6 months (around 4/18/2022).     --Mavis Albrecht MD

## 2021-10-18 NOTE — PATIENT INSTRUCTIONS
Biopsy Wound Care Instructions    · Keep the bandage in place for 24 hours. · Cleanse the wound with mild soapy water daily   Gently dry the area.  Apply Vaseline or petroleum jelly to the wound using a cotton tipped applicator.  Cover with a clean bandage.  Repeat this process until the biopsy site is healed.  If you had stitches placed, continue treating the site until the stitches are removed. Remember to make an appointment to return to have your stitches removed by our staff.  You may shower and bathe as usual.       ** Biopsy results generally take around 7 business days to come back. If you have not heard from us by then, please call the office at [phone]. *Please note that biopsy results are released to both the patient and physician at the same time in [address] Please allow time for your physician to review the results. One of our staff members will reach out to you with the results and plan.

## 2021-10-20 LAB — DERMATOLOGY PATHOLOGY REPORT: ABNORMAL

## 2021-10-21 DIAGNOSIS — C44.319 BASAL CELL CARCINOMA (BCC) OF RIGHT FOREHEAD: Primary | ICD-10-CM

## 2021-12-15 ENCOUNTER — PROCEDURE VISIT (OUTPATIENT)
Dept: SURGERY | Age: 76
End: 2021-12-15
Payer: MEDICARE

## 2021-12-15 VITALS — SYSTOLIC BLOOD PRESSURE: 104 MMHG | DIASTOLIC BLOOD PRESSURE: 58 MMHG | HEART RATE: 67 BPM | TEMPERATURE: 97.7 F

## 2021-12-15 DIAGNOSIS — C44.319 BASAL CELL CARCINOMA (BCC) OF RIGHT FOREHEAD: Primary | ICD-10-CM

## 2021-12-15 PROCEDURE — 17312 MOHS ADDL STAGE: CPT | Performed by: DERMATOLOGY

## 2021-12-15 PROCEDURE — 12052 INTMD RPR FACE/MM 2.6-5.0 CM: CPT | Performed by: DERMATOLOGY

## 2021-12-15 PROCEDURE — 17311 MOHS 1 STAGE H/N/HF/G: CPT | Performed by: DERMATOLOGY

## 2021-12-15 NOTE — PROGRESS NOTES
MOHS PROCEDURE NOTE    PHYSICIAN:  Cassandra Cervantes. Kezia Hayes MD, Who operated in two distinct and integrated capacities as the surgeon removing the tissue and as the pathologist examining the tissue. ASSISTANT: Neelam Dumont RN       REFERRING PROVIDER:   Tresia Lesch, MD      PREOPERATIVE DIAGNOSIS: Basal Cell Carcinoma, Focally infiltrative     SPECIFIC MOHS INDICATIONS:  location and need for tissue conservation    AUC SCORIN/9    POSTOPERATIVE DIAGNOSIS: SAME    LOCATION: Right Lower Forehead    OPERATIVE PROCEDURE:  MOHS MICROGRAPHIC SURGERY    RECONSTRUCTION OF DEFECT: Intermediate layered closure    PREOPERATIVE SIZE: 7 x 7  MM    DEFECT SIZE: 12 x 10 MM    LENGTH OF REPAIRED WOUND/SIZE OF FLAP/SIZE OF GRAFT:  38 MM    ANESTHESIA:  4.5mL 1% lidocaine with epinephrine 1:100,000 buffered. EBL:  MINIMAL    DURATION OF PROCEDURE:  2 HOURS    POSTOPERATIVE OBSERVATION: 20 Mins HOUR    SPECIMENS:  SEE MOHS MAP    COMPLICATIONS:  NONE    DESCRIPTION OF PROCEDURE:  The patient was given a mirror, as appropriate, and the biopsy site was identified, marked with a surgical marking pen, and verified by the patient. Options for treatment were discussed and the patient was informed that Mohs surgery was the selected treatment based on its lower recurrence rate, given the features listed above, as compared to other treatment modalities such as excision, radiation, or curettage, and agreed with this treatment plan. Risks and benefits including bruising, swelling, bleeding, infection, nerve injury, recurrence, and scarring were discussed with the patient prior to the procedure and a written consent detailing these and other risks was reviewed with the patient and signed. There was a time out for person and procedure verification. The surgical site was prepped with an antiseptic solution. Application of an antiseptic solution was repeated before each surgical stage.       Stage I:  The clinically-apparent tumor was carefully defined and debulked, determining the edge of the surgical excision. A thin layer of tumor-laden tissue was excised with a narrow margin of normal-appearing skin, using the technique of Mohs. A map was prepared to correspond to the area of skin from which it was excised. Hemostasis was achieved using electrosurgery. The wound was bandaged. The tissue was prepared for the cryostat and sectioned. 1 section(s) prepared. Each section was coded, cut, and stained for microscopic examination. The entire base and margins of the excised piece of tissue were examined by the surgeon. The tissue was examined to the level of subcutaneous fat. Stage I:  Nodular BCC: large basaloid lobules of varying shape and size with peripheral palisading present around the rim of the lobule, with retraction of the tumor lobules from their associated stroma. The remaining tumor was noted and the next stage was performed. Stage II:  A thin layer of tissue was removed at the histologically-identified sites of remaining tumor. The entire procedure as described in stage I was repeated to process the tissue according to Mohs technique. 1 section(s) prepared for stage II. No tumor was identified at the peripheral margins of stage II of microscopically controlled surgery. DEFECT MANAGEMENT:    REPAIR DESCRIPTION:  Various closure modalities were discussed with the patient, and it was decided that an intermediate layered repair would best preserve normal anatomic and functional relationships. Additional risk of wound dehiscence was discussed. The area was anesthetized with 1% lidocaine with epinephrine 1:100,000 buffered, was given a sterile prep using Chlorhexidine gluconate 4% solution and draped in the usual sterile fashion. Recreation and enlargement of the wound was performed by excising cones of tissue via the triangulation technique.     The final incision lines were placed with respect for the patient's natural skin tension lines in a linear configuration to avoid functional and aesthetic distortion of adjacent free margins. Following minimal undermining, meticulous hemostasis was obtained with spot monopolar electrocoagulation. Subcutaneous dead space and dermis were closed using 5-0 Vicryl buried subcutaneous interrupted suture and the epidermis was approximated with 5-0 Fast absorbing gut running epidermal sutures. WOUND COVERAGE:  The wound was cleaned with normal saline solution, dried off, Aquaphor ointment was applied, and the wound was covered. A dressing was applied for stabilization and light pressure. The patient was given detailed oral and written instructions on postoperative care. There were no complications. The patient left the Unit in good medical condition. FOLLOW-UP:  As dissolving sutures were placed, the patient was asked to return if any questions or concerns arose, but otherwise will return to see general dermatology per their instructions.

## 2021-12-15 NOTE — PROGRESS NOTES
PRE-PROCEDURE SCREENING    Pacemaker/ICD: No  Difficulty with numbing in the past: No  Local Anesthesia Reaction/passing out: No  Latex or adhesive allergy:  No  Bleeding/Clotting Disorders: No  Anticoagulant Therapy: No  Joint prosthesis: Yes hip 2017  Artificial Heart Valve: No  Stroke or Seizures: No  Organ Transplant or Lymphoma: No  Immunosuppression: No  Respiratory Problems: No

## 2021-12-15 NOTE — PATIENT INSTRUCTIONS
[facility] Office Hours:    Monday-Thursday  7:30 AM-4:30 PM    Friday  9:00 AM-1:00 PM    POST-OPERATIVE CARE FOR DISSOLVING STICHES             Bandage change after 48 hours    During your procedure today, dissolving sutures, or stiches, were used to close the wound. You do not have to have stiches removed. They will dissolve (melt away) on their own. Please follow these instructions to help you recover from your procedure and help your wound heal.    CARING FOR YOUR SURGICAL SITE  The bandage should remain on and completley dry for 48 hours. Do NOT get the bandage wet. 1. After the first 48 hours, gently remove the remaining part of the bandage. It can be helpful to moisten the bandage edges in the shower. Steri strips may still be on the wound. It is ok, they will fall off slowly with the daily bandage changes. 2. Gently clean AROUND the wound daily with mild soap and water. Please avoid the area from getting wet. The more moisture is on the sutures the quicker they will dissolve. 3. Dry (pat) the area with a clean Q-tip or gauze. Try to clean off any debris or crust from the area. 4. Apply a layer of Vaseline/ Aquaphor (or Bacitracin if your doctor recommends) to the wound area only. 5. Cut a piece of Telfa (or any non-stick dressing) to fit just over the wound and secure it with paper tape. If the wound is small you may use a Band- Aid. Keep area covered for a total of 1 week(s). If the dressing comes off or if you have questions, or concerns about the dressing, please call the office for instructions! POST OPERATIVE INSTRUCTIONS    1. Activity: Do not lift anything heavier than a gallon of milk for 1 week. Also, avoid strenuous activity such as running, power walking or contact sports. 2. Eating and drinking: Do not drink alcohol for 48 hours after your procedure. Alcohol increases the chances of bleeding.   3. Medicines   -If you have discomfort, take Acetaminophen (Tylenol or Extra Strength Tylenol). Follow the instructions and warning on the bottle. -If your doctor has prescribed you an Aspirin daily, please keep taking it. Do not take extra Aspirin or medicines containing Aspirin (such as [place] and Excedrin) for 48 hours  after your procedure. Bleeding: If bleeding occurs, DO NOT remove the bandage. Put firm pressure on the area with gauze for 20 minutes without peeking. If the bleeding continue, apply pressure for another 20 minutes. If the bleeding does not stop after you apply pressure, call us right away. If you can not call, go to the nearest emergency room or urgent care facility. What to expect:  You may have these symptoms. They are normal and should get better with time:  1. Swelling. Swelling usually increases for the first 48 hours after your procedure and then begins to improve. Some soreness and redness around your wound. If we worked close to you eyes  (forehead, nose, temple, or upper cheeks) your eyes may become swollen and/ or black and blue. 2. Bruising, which could last 1 week or more. 3. Pink and bumpy appearance to the scar. This may happen a few weeks after your procedure. After 4 weeks, you may gently massage the area each day with facial moisturizer or petroleum jelly (Vaseline or Aquaphor). This will help to smooth the skin and improve the appearance of the scar. The color of your scar will fade over time, but may be pink for several months after the procedure. The scar may take 6 months to 1 year to reach its final color and appearance. 4. \"Spitting\" suture. Occasionally, an inside suture (stitch) does not completely dissolve. When this happens, (generally 4-8 weeks after surgery), it causes a bump or \"pimple\" to form on the scar. This is easily removed and is not at all serious. It does not mean the skin cancer has returned. Contact us if it happens, but do not be alarmed. Vitamin E oil is NOT necessary.  A good moisturizer is just as effective. Sunscreen IS necessary. Use at least and SPF 30 sunscreen daily- even in winter    Call us at [phone] right away if you have any of the following symptoms:  -Bleeding that you can not stop (see highlighted area above)   -Pain that lasts longer than 48 hours  -Your wound becomes  more painful, red or hot  -Bruising and swelling that does not begin to improve within the 48 hours or gets worse suddenly.

## 2021-12-16 ENCOUNTER — TELEPHONE (OUTPATIENT)
Dept: SURGERY | Age: 76
End: 2021-12-16

## 2021-12-16 NOTE — TELEPHONE ENCOUNTER
The patient was in the office on 12/15/21 for Mohd located on the Right Lower Forehead with ICL repair. The patient tolerated the procedure well and left the office in good condition. A post-operative telephone call was placed at 12/16/2021 at 1:36 PM   in order to check on the patient's recovery process. The pt could not be reached and a voice message was left.

## 2022-04-19 ENCOUNTER — OFFICE VISIT (OUTPATIENT)
Dept: DERMATOLOGY | Age: 77
End: 2022-04-19
Payer: MEDICARE

## 2022-04-19 VITALS — TEMPERATURE: 96.8 F

## 2022-04-19 DIAGNOSIS — L57.0 AK (ACTINIC KERATOSIS): ICD-10-CM

## 2022-04-19 DIAGNOSIS — D48.5 NEOPLASM OF UNCERTAIN BEHAVIOR OF SKIN: Primary | ICD-10-CM

## 2022-04-19 PROCEDURE — 11102 TANGNTL BX SKIN SINGLE LES: CPT | Performed by: DERMATOLOGY

## 2022-04-19 PROCEDURE — 11103 TANGNTL BX SKIN EA SEP/ADDL: CPT | Performed by: DERMATOLOGY

## 2022-04-19 PROCEDURE — 17000 DESTRUCT PREMALG LESION: CPT | Performed by: DERMATOLOGY

## 2022-04-19 PROCEDURE — 17003 DESTRUCT PREMALG LES 2-14: CPT | Performed by: DERMATOLOGY

## 2022-04-19 RX ORDER — PRAMIPEXOLE DIHYDROCHLORIDE 0.12 MG/1
0.12 TABLET ORAL
COMMUNITY
Start: 2022-02-11

## 2022-04-19 RX ORDER — MELOXICAM 15 MG/1
TABLET ORAL
COMMUNITY
Start: 2022-04-11

## 2022-04-19 NOTE — PROGRESS NOTES
[facility]  Karina Arcos MD  [phone]      Steffany Lori  1945    65 y.o. male     Date of Visit: 4/19/2022    Chief Complaint: skin lesions    History of Present Illness:    1. Unknown duration of a dark lesion on the right forearm. 2.  He also has a crusted pink lesion on the left upper back. 3.  Follow-up for history of AK's-has several new lesions on the scalp and face. Derm Hx:    Focally infiltrative BCC on the right lower forehead-treated with Mohs by Dr. Fani Arellano on 12/15/2021. SCC on the left forearm-excised on 9/3/2021. Superficial nodular BCC on the left medial cheek-treated with Mohs by Dr. Fani Arellano on 4/28/2021. Superficial BCC on the right upper back and nodular and superficial BCC on the left mid leg-treated with electrodesiccation and curettage on 4/23/2021. SCC of the vertex scalp-treated with Mohs by Dr. Fani Arellano on 12/4/2019. Nodular BCC of the left superior lateral leg-excised 11/22/2019. 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 Superficial BCC on the right lower back and left medial lower leg both treated with curettage on 10/6/2017. Infiltrating BCC of the right central forehead-treated with Mohs by Dr. Fani Arellano on 3/28/2017. Nodular BCC of the right upper forehead-treated with Mohs by Dr. Fani Arellano on 3/28/2017.     Nodular BCC of the right mid upper back-excised on 1/13/2017. Superficial BCC of the right distal posterior thigh-treated with curettage on 1/13/2017. Nodular BCC of the right lower shin-treated with curettage on 1/13/2017. Basal cell carcinoma of the left leg-excised in August 2015. 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 Nodular BCC of the left leg-excised in 2012.     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 Review of Systems:  Gen: Feels well, good sense of health.     Past Medical History, Family History, Surgical History, Medications and Allergies reviewed. Past Medical History:   Diagnosis Date    Cancer ([address] 12/15/2021    BCC right forehead    Hyperlipidemia     Hypertension     Tremor of left hand      Past Surgical History:   Procedure Laterality Date    BACK SURGERY  06/2020    FRACTURE SURGERY      wrist    MOHS SURGERY  12/04/2019    Scalp    MOHS SURGERY Right 12/15/2021    BCC right lower forehead    TOTAL HIP ARTHROPLASTY Left 08/2019       No Known Allergies  Outpatient Medications Marked as Taking for the 4/19/22 encounter (Office Visit) with Munir Marsh MD   Medication Sig Dispense Refill    pramipexole (MIRAPEX) 0.125 MG tablet 0.125 mg      meloxicam (MOBIC) 15 MG tablet       carbidopa-levodopa (SINEMET)  MG per tablet TAKE ONE (1) TABLET BY MOUTH EVERY 24 HOURS      atorvastatin (LIPITOR) 20 MG tablet       lisinopril (PRINIVIL;ZESTRIL) 10 MG tablet              Physical Examination       The following were examined and determined to be normal: Psych/Neuro, Conjunctivae/eyelids, Gums/teeth/lips, Neck, Breast/axilla/chest, Abdomen, LUE, RLE, LLE and Nails/digits. The following were examined and determined to be abnormal: Scalp/hair, Head/face, Back and RUE. Well-appearing. 1.  Right mid extensor forearm with a 3 mm round grayish-black macule. 2.  Left upper back with an 8 mm crusted telangiectatic pink papule. 3.  Right crown of scalp-1, left forearm-1, right medial cheek-1, right lower cheek-1: Several keratotic erythematous macules. Assessment and Plan     1. Neoplasm of uncertain behavior of skin, right forearm - dysplastic nevus vs early MM    Discussed possible diagnosis; patient agreeable to biopsy (consent obtained). Risks reviewed including discomfort, bleeding, scar and infection. The area(s) to be biopsied were marked with a surgical pen. Alcohol was used to cleanse the site.  Local anesthesia was acheived with 1% lidocaine with epinephrine. Shave biopsy was performed using a razor blade. Hemostasis was achieved with aluminum chloride. The wound(s) were dressed with petrolatum and covered with a bandage. Wound care instructions were reviewed. 1 Specimen (s) sent to pathology. The specimen bottles were appropriately labeled. 2. Possible BCC (basal cell carcinoma), left upper back - 8 mm    Discussed likely diagnosis; patient agreeable to biopsy and curettage (verbal consent obtained). The area(s) to be biopsied were marked with a surgical pen. Alcohol was used to cleanse the site. Local anesthesia was acheived with 1% lidocaine with epinephrine. Shave biopsy was performed using a razor blade followed by sharp curettage in multiple directions. Hemostasis was achieved with aluminum chloride. The wound(s) were dressed with petrolatum and covered with a bandage. Wound care instructions were reviewed. 1 Specimen (s) sent to pathology. The specimen bottles were appropriately labeled. We also reviewed the risks of bleeding, scar, and infection. 3. AK (actinic keratosis) - several    2 cycles of liquid nitrogen applied to 4 AKs: Right crown of scalp-1, left forearm-1, right medial cheek-1, right lower cheek-1. Patient was educated regarding the potential risks of blister formation and discomfort. Wound care was discussed. Return in about 6 months (around 10/19/2022).     --Beny Bonilla MD

## 2022-04-19 NOTE — PATIENT INSTRUCTIONS
Biopsy Wound Care Instructions    · Keep the bandage in place for 24 hours. · Cleanse the wound with mild soapy water daily   Gently dry the area.  Apply Vaseline or petroleum jelly to the wound using a cotton tipped applicator.  Cover with a clean bandage.  Repeat this process until the biopsy site is healed.  If you had stitches placed, continue treating the site until the stitches are removed. Remember to make an appointment to return to have your stitches removed by our staff.  You may shower and bathe as usual.       ** Biopsy results generally take around 7 business days to come back. If you have not heard from us by then, please call the office at [phone]. *Please note that biopsy results are released to both the patient and physician at the same time in [address] Please allow time for your physician to review the results. One of our staff members will reach out to you with the results and plan.

## 2022-04-21 ENCOUNTER — PATIENT MESSAGE (OUTPATIENT)
Dept: DERMATOLOGY | Age: 77
End: 2022-04-21

## 2022-04-21 LAB — DERMATOLOGY PATHOLOGY REPORT: ABNORMAL

## 2022-05-13 ENCOUNTER — PROCEDURE VISIT (OUTPATIENT)
Dept: DERMATOLOGY | Age: 77
End: 2022-05-13
Payer: MEDICARE

## 2022-05-13 VITALS — TEMPERATURE: 97.2 F

## 2022-05-13 DIAGNOSIS — C44.519 BCC (BASAL CELL CARCINOMA), BACK: Primary | ICD-10-CM

## 2022-05-13 PROCEDURE — 11603 EXC TR-EXT MAL+MARG 2.1-3 CM: CPT | Performed by: DERMATOLOGY

## 2022-05-13 PROCEDURE — 12032 INTMD RPR S/A/T/EXT 2.6-7.5: CPT | Performed by: DERMATOLOGY

## 2022-05-13 NOTE — PATIENT INSTRUCTIONS
Surgical Wound Care Instructions    Sutured Wounds:     After your surgery, go home and take it easy. Please refrain from any vigorous physical activity or heavy lifting for 14 days.  Leave the pressure bandage in place for 48 hours. If it starts to detach, reinforce the bandage with another piece of tape.  Please keep the bandage dry for 48 hours.  After 48 hours, the wound can get wet. Clean the area daily using mild soapy water.  Apply a thin layer of Aquaphor, Vaseline or petroleum jelly.  Apply a non stick gauze pad or bandage to the surgical wound daily and secure with medical tape for 14 days. Complications:     If bleeding develops when you go home, apply pressure for 15 minutes continuously. A small amount of ice in a bag covered with a towel may be used for another 10 minutes if necessary.   If the bleeding does not stop, please call your dermatologist.     Call your doctor if you have any of these signs of infection:     o Skin around the wound is more red, swollen or feels hot    o Wound smells bad    o Pus drainage    o Fever    o Increasing pain

## 2022-05-13 NOTE — PROGRESS NOTES
[facility]  Francis Garza MD  [phone]      Camille Negrete  1945    65 y.o. male     Date of Visit: 5/13/2022    Chief Complaint: [address]    History of Present Illness:    Here today for excision of a [address] on the left upper back. B. LEFT UPPER BACK-     Basal cell carcinoma, metatypical, infiltrating pattern   RESULTS SIGNATURE   Reba Kim MD   Electronic Signature: 21 APR 2022 10:39 AM       Review of Systems:  Gen: Feels well, good sense of health. Heme: No abnormal bruising or bleeding. Past Medical History, Family History, Surgical History, Medications and Allergies reviewed. Past Medical History:   Diagnosis Date    Cancer ([address] 12/15/2021    BCC right forehead    Hyperlipidemia     Hypertension     Tremor of left hand      Past Surgical History:   Procedure Laterality Date    BACK SURGERY  06/2020    FRACTURE SURGERY      wrist    MOHS SURGERY  12/04/2019    Scalp    MOHS SURGERY Right 12/15/2021    BCC right lower forehead    TOTAL HIP ARTHROPLASTY Left 08/2019       No Known Allergies  Outpatient Medications Marked as Taking for the 5/13/22 encounter (Procedure visit) with Gurvinder Jacobs MD   Medication Sig Dispense Refill    pramipexole (MIRAPEX) 0.125 MG tablet 0.125 mg      meloxicam (MOBIC) 15 MG tablet       carbidopa-levodopa (SINEMET)  MG per tablet TAKE ONE (1) TABLET BY MOUTH EVERY 24 HOURS      atorvastatin (LIPITOR) 20 MG tablet       lisinopril (PRINIVIL;ZESTRIL) 10 MG tablet       fluorouracil (EFUDEX) 5 % cream Apply twice daily to the forehead and top of the scalp for 14 days. 40 g 0         Physical Examination       Well appearing. 1.  Left upper back - 1.2 cm oval shaped pink biopsy site. Assessment and Plan     1. Metatypical BCC (basal cell carcinoma), left upper back -     Procedure note: The site to be treated was confirmed with the patient and the previous note.      The patient was educated regarding potential risks of bleeding, discomfort, scar, and recurrence. A consent form was signed by the patient. The area was prepped and draped in the normal sterile fashion using Betadine. Local anesthesia was achieved with 1% lidocaine with epinephrine. An elliptical excision was performed (width including margins = 2.3 cm) down to subcutaneous tissue. The specimen was submitted to pathology in an appropriately labeled specimen bottle. Pinpoint hemostasis was obtained. The wound edges were undermined and approximated in a layered fashion with buried sutures of 3-0 PDS allowing for wound eversion and superficial running sutures of 4-0 nylon. The final length of the wound measured 7 cm. Blood loss was minimal and there were no immediate complications. The wound was dressed with petrolatum and a pressure bandage.      The patient is to return in 2 weeks for suture removal.          --Reese Sadler MD

## 2022-05-17 LAB — DERMATOLOGY PATHOLOGY REPORT: ABNORMAL

## 2022-10-19 ENCOUNTER — OFFICE VISIT (OUTPATIENT)
Dept: DERMATOLOGY | Age: 77
End: 2022-10-19
Payer: MEDICARE

## 2022-10-19 DIAGNOSIS — L82.1 SK (SEBORRHEIC KERATOSIS): ICD-10-CM

## 2022-10-19 DIAGNOSIS — L57.0 ACTINIC KERATOSIS: Primary | ICD-10-CM

## 2022-10-19 DIAGNOSIS — D48.5 NEOPLASM OF UNCERTAIN BEHAVIOR OF SKIN: ICD-10-CM

## 2022-10-19 PROCEDURE — 99212 OFFICE O/P EST SF 10 MIN: CPT | Performed by: DERMATOLOGY

## 2022-10-19 PROCEDURE — 11102 TANGNTL BX SKIN SINGLE LES: CPT | Performed by: DERMATOLOGY

## 2022-10-19 PROCEDURE — 1123F ACP DISCUSS/DSCN MKR DOCD: CPT | Performed by: DERMATOLOGY

## 2022-10-19 PROCEDURE — 17003 DESTRUCT PREMALG LES 2-14: CPT | Performed by: DERMATOLOGY

## 2022-10-19 PROCEDURE — 17000 DESTRUCT PREMALG LESION: CPT | Performed by: DERMATOLOGY

## 2022-10-19 PROCEDURE — 1036F TOBACCO NON-USER: CPT | Performed by: DERMATOLOGY

## 2022-10-19 PROCEDURE — G8421 BMI NOT CALCULATED: HCPCS | Performed by: DERMATOLOGY

## 2022-10-19 PROCEDURE — G8427 DOCREV CUR MEDS BY ELIG CLIN: HCPCS | Performed by: DERMATOLOGY

## 2022-10-19 PROCEDURE — G8484 FLU IMMUNIZE NO ADMIN: HCPCS | Performed by: DERMATOLOGY

## 2022-10-19 RX ORDER — GABAPENTIN 300 MG/1
CAPSULE ORAL
COMMUNITY
Start: 2022-09-30

## 2022-10-19 NOTE — PATIENT INSTRUCTIONS

## 2022-10-19 NOTE — PROGRESS NOTES
[id number] [facility]  Maylin Adam MD  [phone]      Marielos Alicea  1945    65 y.o. male     Date of Visit: 10/19/2022    Chief Complaint: skin lesions    History of Present Illness:    1. Follow-up for history of actinic keratoses-has several new lesions on the scalp and right side of the neck. 2.  Unknown duration of an asymptomatic pink lesion on the right lateral cheek. 3.  He reports several growths on the chest and left hand. Dermatologic history:    Metatypical infiltrating BCC on the left upper back-excised on 5/13/2022. Focally infiltrative BCC on the right lower forehead-treated with Mohs by Dr. Jose J Lott on 12/15/2021. SCC on the left forearm-excised on 9/3/2021. Superficial nodular BCC on the left medial cheek-treated with Mohs by Dr. Jose J Lott on 4/28/2021. Superficial BCC on the right upper back and nodular and superficial BCC on the left mid leg-treated with electrodesiccation and curettage on 4/23/2021. SCC of the vertex scalp-treated with Mohs by Dr. Jose J Lott on 12/4/2019. Nodular BCC of the left superior lateral leg-excised 11/22/2019. 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 Superficial BCC on the right lower back and left medial lower leg both treated with curettage on 10/6/2017. Infiltrating BCC of the right central forehead-treated with Mohs by Dr. Jose J Lott on 3/28/2017. Nodular BCC of the right upper forehead-treated with Mohs by Dr. Jose J Lott on 3/28/2017. Nodular BCC of the right mid upper back-excised on 1/13/2017. Superficial BCC of the right distal posterior thigh-treated with curettage on 1/13/2017. Nodular BCC of the right lower shin-treated with curettage on 1/13/2017. Basal cell carcinoma of the left leg-excised in August 2015. 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   Nodular BCC of the left leg-excised in 2012.     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 Review of Systems:  Gen: Feels well, good sense of health. Skin: No new or changing moles. Past Medical History, Family History, Surgical History, Medications and Allergies reviewed. Past Medical History:   Diagnosis Date    Cancer ([address] 12/15/2021    BCC right forehead    Hyperlipidemia     Hypertension     Tremor of left hand      Past Surgical History:   Procedure Laterality Date    BACK SURGERY  06/2020    FRACTURE SURGERY      wrist    MOHS SURGERY  12/04/2019    Scalp    MOHS SURGERY Right 12/15/2021    BCC right lower forehead    TOTAL HIP ARTHROPLASTY Left 08/2019       No Known Allergies  Outpatient Medications Marked as Taking for the 10/19/22 encounter (Office Visit) with Bridget Villasenor MD   Medication Sig Dispense Refill    pramipexole (MIRAPEX) 0.125 MG tablet 0.125 mg      meloxicam (MOBIC) 15 MG tablet       carbidopa-levodopa (SINEMET)  MG per tablet TAKE ONE (1) TABLET BY MOUTH EVERY 24 HOURS      atorvastatin (LIPITOR) 20 MG tablet       lisinopril (PRINIVIL;ZESTRIL) 10 MG tablet       fluorouracil (EFUDEX) 5 % cream Apply twice daily to the forehead and top of the scalp for 14 days. 40 g 0         Physical Examination       The following were examined and determined to be normal: Psych/Neuro, Conjunctivae/eyelids, Gums/teeth/lips, Breast/axilla/chest, Abdomen, Back, RUE, LUE, RLE, LLE, and Nails/digits. The following were examined and determined to be abnormal: Scalp/hair, Head/face, and Neck. Well-appearing. 1.  Mid vertex and frontal scalp-5, right lateral superior neck-2: Several hyperkeratotic erythematous macules. 2.  Right lateral cheek, 3.5 cm anterior to the ear, with a 7 mm telangiectatic pink macule. 3.  Chest and left lateral hand with stuck on appearing verrucous tan papules and plaques. Assessment and Plan     1.  Actinic keratoses - 7    Cryotherapy was discussed and patient agreed to proceed. Consent was obtained. 7 lesions were treated cryotherapy: Mid vertex and frontal scalp-5, right lateral superior neck-2. 2 cycles of liquid nitrogen applied to each lesion for 5 seconds using a Cry-Ac cryo spray gun. Patient was educated regarding the potential risks of blister formation and discomfort. Wound care was discussed. The patient tolerated the procedure well and there were no immediate complications. 2. Neoplasm of uncertain behavior of skin, right lateral cheek - r/o BCC    Discussed possible diagnosis; patient agreeable to proceed with biopsy (written consent obtained). Risks of the procedure were reviewed including discomfort, bleeding, scar and infection. The area(s) to be biopsied were marked with a surgical pen. Alcohol was used to cleanse the site. Local anesthesia was acheived with 1% lidocaine with epinephrine. Shave biopsy was performed using a razor blade. Hemostasis was achieved with aluminum chloride. The wound(s) were dressed with petrolatum and covered with a bandage. Wound care instructions were reviewed. 1 Specimen (s) sent to pathology. The specimen bottles were appropriately labeled. The patient tolerated the procedure well and there were no immediate complications. 3. SK (seborrheic keratosis)     Reassurance. Return in about 6 months (around 4/19/2023).     --Shane Hayes MD

## 2022-10-21 LAB — DERMATOLOGY PATHOLOGY REPORT: ABNORMAL

## 2022-10-24 DIAGNOSIS — C44.319 BASAL CELL CARCINOMA (BCC) OF RIGHT CHEEK: Primary | ICD-10-CM

## 2022-11-15 ENCOUNTER — PROCEDURE VISIT (OUTPATIENT)
Dept: SURGERY | Age: 77
End: 2022-11-15
Payer: MEDICARE

## 2022-11-15 VITALS — SYSTOLIC BLOOD PRESSURE: 106 MMHG | DIASTOLIC BLOOD PRESSURE: 48 MMHG | HEART RATE: 70 BPM

## 2022-11-15 DIAGNOSIS — C44.319 BASAL CELL CARCINOMA OF RIGHT LATERAL CHEEK: Primary | ICD-10-CM

## 2022-11-15 PROCEDURE — 12052 INTMD RPR FACE/MM 2.6-5.0 CM: CPT | Performed by: DERMATOLOGY

## 2022-11-15 PROCEDURE — 17311 MOHS 1 STAGE H/N/HF/G: CPT | Performed by: DERMATOLOGY

## 2022-11-15 NOTE — Clinical Note
Lorenzo - there were a few areas of actinic keratosis at the margins of Oneil's Mohs case for bcc on right cheek. Clinically his skin didn't look \"worrisome\" for field cancerization but course of efudex might be appropriate at some point if it fits with your overall tx plan.

## 2022-11-15 NOTE — PATIENT INSTRUCTIONS
[facility] Office Hours:    Monday-Thursday  7:30 AM-4:30 PM    Friday  9:00 AM-1:00 PM    POST-OPERATIVE CARE FOR LIQUID SKIN ADHESIVE             Bandage change after 24 hours    During your procedure today, a liquid skin adhesive was used to close the wound. You do not have to have stiches removed. If has a clear to light purple shiny surface. You do not have to have this removed. It will dissolve (melt away) in about 1-2 weeks. Please follow these instructions to help you recover from your procedure and help your wound heal.    CARING FOR YOUR SURGICAL SITE  The bandage should remain on and completely dry for 24 hours. Do NOT get the bandage wet. After the first 24 hours, gently remove the remaining part of the bandage. It can be helpful to moisten the bandage edges in the shower. Be gentle around the area of the wound. Do not scrub, rub or pick at the skin glue. It will gradually dissolve in 1-2 weeks. Do not shave directly over the wound for one week. You can shave around the area. After one week you can start cleaning the area gently and resume all normal activity. No further restrictions. Use Sunscreen with SPF of at least 30 on the area around the wound. If the dressing comes off or if you have questions, or concerns about the dressing, please call the office for instructions! POST OPERATIVE INSTRUCTIONS    Activity: it is recommended to avoid strenuous activity such as lifting, pushing, pulling, running, power walking or contact sports for at least 2-7 days or as recommended by your provider. Eating and drinking: Do not drink alcohol for 48 hours after your procedure. Alcohol increases the chances of bleeding. Medicines   -If you have discomfort, take Acetaminophen (Tylenol or Extra Strength Tylenol). Follow the instructions and warning on the bottle. Bleeding: If bleeding occurs, Put firm pressure on the area with gauze for 20 minutes without peeking.  If the bleeding continues you may remove the bandage to locate where the bleeding is coming from and apply pressure for another 20 minutes with gauze and an ice pack. If the bleeding does not stop after you apply pressure and ice pack, call us right away. If you call after hours a call service will transfer you to the physician. If you cannot call or get through to the doctor, go to the nearest emergency room or urgent care facility. What to expect:  You may have these symptoms. They are normal and should get better with time:  Swelling. Swelling usually increases for the first 48 hours after your procedure and then begins to improve. Some soreness and redness around your wound. If we worked close to your eyes (forehead, nose, temple, or upper cheeks) your eyes may become swollen and/ or black and blue. Bruising, which could last 1 week or more. Pink and bumpy appearance to the scar. This may happen a few weeks after your procedure. After 4 weeks, you may gently massage the area each day with facial moisturizer or petroleum jelly (Vaseline or Aquaphor). This will help to smooth the skin and improve the appearance of the scar. The color of your scar will fade over time but may be pink for several months after the procedure. The scar may take 6 months to 1 year to reach its final color and appearance. \"Spitting\" suture. Occasionally, an inside suture (stitch) does not completely dissolve. When this happens, (generally 4-8 weeks after surgery), it causes a bump or \"pimple\" to form on the scar. This is easily removed and is not at all serious. It does not mean the skin cancer has returned. Contact us if it happens, but do not be alarmed. Vitamin E oil is NOT necessary. A good moisturizer is just as effective. Sunscreen IS necessary. Use at least and SPF 30 sunscreen daily- even in winter    [address] [place] [address]  -Scars take from 6 months to 1 year to fully mature.  After the area has healed, it may be helpful to gently massage the area with a moisturizer, petroleum jelly (Vaseline) or Aquaphor. This helps to soften the scar tissue. You can begin this 1 month after the day of your surgery. -A Silicone scar gel product may also be beneficial in regards to scar appearance. This can be used but is not usually necessary.  -The color of the scar will even out over time, but may remain pink for several months. Call us at [phone] right away if you have any of the following symptoms:  -Bleeding that you cannot stop (see highlighted area above)   -Pain that lasts longer than 48 hours  -Your wound becomes more painful, red or hot to touch  -Bruising and swelling that does not begin to improve within the 48 hours or gets worse suddenly.

## 2022-11-15 NOTE — PROGRESS NOTES
MOHS PROCEDURE NOTE    PHYSICIAN:  Aimee Corcoran. Anais Del Rosario MD, Who operated in two distinct and integrated capacities as the surgeon removing the tissue and as the pathologist examining the tissue. ASSISTANT: Ramin Farrell RN, Harper David CMA     REFERRING PROVIDER:   Alejandra Moctezuma MD    PREOPERATIVE DIAGNOSIS: Nodular Basal Cell Carcinoma     SPECIFIC MOHS INDICATIONS:  location and need for tissue conservation    AUC SCORIN/9    POSTOPERATIVE DIAGNOSIS: SAME    LOCATION: Right lateral cheek    OPERATIVE PROCEDURE:  MOHS MICROGRAPHIC SURGERY    RECONSTRUCTION OF DEFECT: Intermediate layered closure    PREOPERATIVE SIZE: 9x6 MM    DEFECT SIZE: 15x10 MM    LENGTH OF REPAIRED WOUND/SIZE OF FLAP/SIZE OF GRAFT:  35 MM    ANESTHESIA:  7mL 1% lidocaine with epinephrine 1:100,000 buffered. EBL:  MINIMAL    DURATION OF PROCEDURE:  1 HOUR    POSTOPERATIVE OBSERVATION: 1 HOUR    SPECIMENS:  SEE MOHS MAP    COMPLICATIONS:  NONE    DESCRIPTION OF PROCEDURE:  The patient was given a mirror, as appropriate, and the biopsy site was identified, marked with a surgical marking pen, and verified by the patient. Options for treatment were discussed and the patient was informed that Mohs surgery was the selected treatment based on its lower recurrence rate, given the features listed above, as compared to other treatment modalities such as excision, radiation, or curettage, and agreed with this treatment plan. Risks and benefits including bruising, swelling, bleeding, infection, nerve injury, recurrence, and scarring were discussed with the patient prior to the procedure and a written consent detailing these and other risks was reviewed with the patient and signed. There was a time out for person and procedure verification. The surgical site was prepped with an antiseptic solution. Application of an antiseptic solution was repeated before each surgical stage.       Stage I:  The clinically-apparent tumor was carefully defined and debulked, determining the edge of the surgical excision. A thin layer of tumor-laden tissue was excised with a narrow margin of normal-appearing skin, using the technique of Mohs. A map was prepared to correspond to the area of skin from which it was excised. Hemostasis was achieved using electrosurgery. The wound was bandaged. The tissue was prepared for the cryostat and sectioned. 1 section(s) prepared. Each section was coded, cut, and stained for microscopic examination. The entire base and margins of the excised piece of tissue were examined by the surgeon. The tissue was examined to the level of subcutaneous fat. Actinic keratosis identified on Mohs layers but no BCC. No tumor was identified at the peripheral margins of stage I of microscopically controlled surgery. DEFECT MANAGEMENT:    REPAIR DESCRIPTION:  Various closure modalities were discussed with the patient, and it was decided that an intermediate layered repair would best preserve normal anatomic and functional relationships. Additional risk of wound dehiscence was discussed. The area was anesthetized with 1% lidocaine with epinephrine 1:100,000 buffered, was given a sterile prep using Chlorhexidine gluconate 4% solution and draped in the usual sterile fashion. Recreation and enlargement of the wound was performed by excising cones of tissue via the triangulation technique. The final incision lines were placed with respect for the patient's natural skin tension lines in a linear configuration to avoid functional and aesthetic distortion of adjacent free margins. Following minimal undermining, meticulous hemostasis was obtained with spot monopolar electrocoagulation. Subcutaneous dead space and dermis were closed using 5-0 Vicryl buried subcutaneous interrupted suture and the epidermis was approximated with   liquid skin adhesive.          WOUND COVERAGE:  The wound was cleaned with normal saline solution, dried off, Aquaphor ointment was applied, and the wound was covered. A dressing was applied for stabilization and light pressure. The patient was given detailed oral and written instructions on postoperative care. There were no complications. The patient left the Unit in good medical condition. FOLLOW-UP:  As liquid skin adhesive was placed for epidermal closure, the patient was asked to return if any questions or concerns arose, but otherwise will return to see general dermatology per their instructions.

## 2022-11-16 ENCOUNTER — TELEPHONE (OUTPATIENT)
Dept: SURGERY | Age: 77
End: 2022-11-16

## 2022-11-16 NOTE — TELEPHONE ENCOUNTER
The patient was in the office on 11/15/22 for MOHS located on the right lateral cheek with [facility] repair. The patient tolerated the procedure well and left the office in good condition. Pain level on post-operative day 1:  present - adequately treated and level of pain (1-10, 10 severe), taking Tylenol    Any bleeding episode that required pressure to be held, bandage change or a call to the office or MD?  no     Any other issues?:  no    A post-operative telephone call was placed at 1:10 pm in order to check on the patient's recovery process. The patient reported doing well and had no complaints other than those listed above, if any. All of the patient's questions were answered.

## 2023-06-22 ENCOUNTER — OFFICE VISIT (OUTPATIENT)
Dept: DERMATOLOGY | Age: 78
End: 2023-06-22
Payer: MEDICARE

## 2023-06-22 DIAGNOSIS — D48.5 NEOPLASM OF UNCERTAIN BEHAVIOR OF SKIN: ICD-10-CM

## 2023-06-22 DIAGNOSIS — L57.0 ACTINIC KERATOSIS: Primary | ICD-10-CM

## 2023-06-22 PROCEDURE — 11102 TANGNTL BX SKIN SINGLE LES: CPT | Performed by: DERMATOLOGY

## 2023-06-22 PROCEDURE — 17003 DESTRUCT PREMALG LES 2-14: CPT | Performed by: DERMATOLOGY

## 2023-06-22 PROCEDURE — 17000 DESTRUCT PREMALG LESION: CPT | Performed by: DERMATOLOGY

## 2023-06-22 RX ORDER — CARBOXYMETHYLCELLULOSE SODIUM 5 MG/ML
SOLUTION/ DROPS OPHTHALMIC
COMMUNITY
Start: 2023-04-06

## 2023-06-22 RX ORDER — FUROSEMIDE 20 MG/1
TABLET ORAL
COMMUNITY
Start: 2023-06-15

## 2023-06-22 NOTE — PROGRESS NOTES
[facility]  Cabrera Corona MD  [phone]      Dagoberto Smith  1945    77 y.o. male     Date of Visit: 6/22/2023    Chief Complaint: skin lesions    History of Present Illness:    1. He presents today to follow-up for history of AK's-has several new lesions on the scalp and face. 2.  Unknown duration of a persistent lesion on the right medial lower leg. Dermatologic history:     Metatypical infiltrating BCC on the left upper back-excised on 5/13/2022. Focally infiltrative BCC on the right lower forehead-treated with Mohs by Dr. Eleuterio Urbina on 12/15/2021. SCC on the left forearm-excised on 9/3/2021. Superficial nodular BCC on the left medial cheek-treated with Mohs by Dr. Eleuterio Urbina on 4/28/2021. Superficial BCC on the right upper back and nodular and superficial BCC on the left mid leg-treated with electrodesiccation and curettage on 4/23/2021. SCC of the vertex scalp-treated with Mohs by Dr. Eleuterio Urbina on 12/4/2019. Nodular BCC of the left superior lateral leg-excised 11/22/2019. 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 Superficial BCC on the right lower back and left medial lower leg both treated with curettage on 10/6/2017. Infiltrating BCC of the right central forehead-treated with Mohs by Dr. Eleuterio Urbina on 3/28/2017. Nodular BCC of the right upper forehead-treated with Mohs by Dr. Eleuterio Urbina on 3/28/2017. Nodular BCC of the right mid upper back-excised on 1/13/2017. Superficial BCC of the right distal posterior thigh-treated with curettage on 1/13/2017. Nodular BCC of the right lower shin-treated with curettage on 1/13/2017. Basal cell carcinoma of the left leg-excised in August 2015. 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 Nodular BCC of the left leg-excised in 2012. 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       Review

## 2023-06-22 NOTE — PATIENT INSTRUCTIONS

## 2023-06-27 LAB — DERMATOLOGY PATHOLOGY REPORT: ABNORMAL

## 2023-07-14 ENCOUNTER — PROCEDURE VISIT (OUTPATIENT)
Dept: DERMATOLOGY | Age: 78
End: 2023-07-14

## 2023-07-14 DIAGNOSIS — D04.71 SQUAMOUS CELL CARCINOMA IN SITU (SCCIS) OF SKIN OF RIGHT LOWER LEG: Primary | ICD-10-CM

## 2023-07-14 NOTE — PROGRESS NOTES
[facility]  Sofia Terrazas MD  [phone]      Elliott Gardner  1945    77 y.o. male     Date of Visit: 7/14/2023    Chief Complaint: skin cancer    History of Present Illness:    Here today for treatment of an SCC in situ of the right medial lower leg. Pathology Report SEE COMMENTS Abnormal     Comment: RESULTS   DIAGNOSIS   DIAGNOSIS:     RIGHT MEDIAL LOWER LEG-     Squamous cell carcinoma in-situ   RESULTS Aayush Shankar MD   Electronic Signature: 27 JUN 2023 02:05 PM          Review of Systems:  Gen: Feels well, good sense of health. Past Medical History, Family History, Surgical History, Medications and Allergies reviewed. Past Medical History:   Diagnosis Date    Cancer ([address]) 12/15/2021    BCC right forehead    Hyperlipidemia     Hypertension     Tremor of left hand      Past Surgical History:   Procedure Laterality Date    BACK SURGERY  06/2020    FRACTURE SURGERY      wrist    MOHS SURGERY  12/04/2019    Scalp    MOHS SURGERY Right 12/15/2021    BCC right lower forehead    MOHS SURGERY Right 11/15/2022    lateral cheek    TOTAL HIP ARTHROPLASTY Left 08/2019       No Known Allergies  Outpatient Medications Marked as Taking for the 7/14/23 encounter (Procedure visit) with Noe Rushing MD   Medication Sig Dispense Refill    carboxymethylcellulose (REFRESH PLUS) 0.5 % SOLN ophthalmic solution INSTILL 1 DROP IN EACH EYE TWICE A DAY FOR DRY EYE      furosemide (LASIX) 20 MG tablet       empagliflozin (JARDIANCE) 10 MG tablet Take 1 tablet by mouth every morning      gabapentin (NEURONTIN) 300 MG capsule       pramipexole (MIRAPEX) 0.125 MG tablet 1 tablet      carbidopa-levodopa (SINEMET)  MG per tablet TAKE ONE (1) TABLET BY MOUTH EVERY 24 HOURS      atorvastatin (LIPITOR) 20 MG tablet       lisinopril (PRINIVIL;ZESTRIL) 10 MG tablet       fluorouracil (EFUDEX) 5 % cream Apply twice daily to the forehead and top of the scalp for 14 days.  40 g 0

## 2023-07-14 NOTE — PATIENT INSTRUCTIONS
Wound Care Instructions    Keep the bandage in place for 24 hours. Cleanse the wound with mild soapy water daily  Gently dry the area. Apply Vaseline or petroleum jelly to the wound using a cotton tipped applicator. Cover with a clean bandage. Repeat this process until the site is healed.   You may shower and bathe as usual.

## 2024-01-08 ENCOUNTER — OFFICE VISIT (OUTPATIENT)
Dept: DERMATOLOGY | Age: 79
End: 2024-01-08
Payer: MEDICARE

## 2024-01-08 DIAGNOSIS — D48.5 NEOPLASM OF UNCERTAIN BEHAVIOR OF SKIN: ICD-10-CM

## 2024-01-08 DIAGNOSIS — L57.0 ACTINIC KERATOSIS: Primary | ICD-10-CM

## 2024-01-08 PROCEDURE — 11102 TANGNTL BX SKIN SINGLE LES: CPT | Performed by: DERMATOLOGY

## 2024-01-08 PROCEDURE — 17000 DESTRUCT PREMALG LESION: CPT | Performed by: DERMATOLOGY

## 2024-01-08 PROCEDURE — 17003 DESTRUCT PREMALG LES 2-14: CPT | Performed by: DERMATOLOGY

## 2024-01-08 NOTE — PROGRESS NOTES
[facility]  Benjamin Marina MD  [phone]      Bladimir Croft  1945    79 y.o. male     Date of Visit: 1/8/2024    Chief Complaint: skin lesions    History of Present Illness:    1.  He presents today for few persistent scaly lesions on the scalp, face and extremities.     2.  Unknown duration of an atypical appearing pigmented lesion on the right clavicular region.    Dermatologic history:    SCC in situ of the right medial lower leg-treated with curettage on 7/14/2023.  Metatypical infiltrating BCC on the left upper back-excised on 5/13/2022.  Focally infiltrative BCC on the right lower forehead-treated with Mohs by Dr. Hummel on 12/15/2021.     SCC on the left forearm-excised on 9/3/2021.  Superficial nodular BCC on the left medial cheek-treated with Mohs by Dr. Hummel on 4/28/2021.  Superficial BCC on the right upper back and nodular and superficial BCC on the left mid leg-treated with electrodesiccation and curettage on 4/23/2021.  SCC of the vertex scalp-treated with Mohs by Dr. Hummel on 12/4/2019.  Nodular BCC of the left superior lateral leg-excised 11/22/2019.  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  Superficial BCC on the right lower back and left medial lower leg both treated with curettage on 10/6/2017.  Infiltrating BCC of the right central forehead-treated with Mohs by Dr. uHmmel on 3/28/2017.  Nodular BCC of the right upper forehead-treated with Mohs by Dr. Hummel on 3/28/2017.     Nodular BCC of the right mid upper back-excised on 1/13/2017.  Superficial BCC of the right distal posterior thigh-treated with curettage on 1/13/2017.  Nodular BCC of the right lower shin-treated with curettage on 1/13/2017.  Basal cell carcinoma of the left leg-excised in August 2015.  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  Nodular BCC of the left

## 2024-01-08 NOTE — PATIENT INSTRUCTIONS
Biopsy Wound Care Instructions    Keep the bandage in place for 24 hours.   Cleanse the wound with mild soapy water daily  Gently dry the area.  Apply Vaseline or petroleum jelly to the wound using a cotton tipped applicator.  Cover with a clean bandage.  Repeat this process until the biopsy site is healed.  If you had stitches placed, continue treating the site until the stitches are removed. Remember to make an appointment to return to have your stitches removed by our staff.  You may shower and bathe as usual.       ** Biopsy results generally take around 7 business days to come back.  If you have not heard from us by then, please call the office at [phone].    *Please note that biopsy results are released to both the patient and physician at the same time in [place].  Please allow time for your physician to review the results.  One of our staff members will reach out to you with the results and plan.

## 2024-01-12 ENCOUNTER — TELEPHONE (OUTPATIENT)
Dept: DERMATOLOGY | Age: 79
End: 2024-01-12

## 2024-01-12 DIAGNOSIS — C43.59 MELANOMA OF SKIN OF TRUNK (HCC): Primary | ICD-10-CM

## 2024-01-12 NOTE — TELEPHONE ENCOUNTER
Reviewed results of the biopsy with the patient.    Date of biopsy: 1/8/24  Site of biopsy: right clavicular region  Result: Superficial spreading melanoma, 1.3 mm in depth.    Plan: Refer to Dr. Bonilla for wide local excision and sentinel lymph node biopsy.    The patient expressed understanding of the plan.

## 2024-01-15 NOTE — PROGRESS NOTES
[facility]  [facility]  [address]  [place] [address]  Phone: [phone]  Fax: [phone]    Visit Date: 1/19/2024    HPI:   Patient is a 79 y.o. male referred by Dr. Benjamin Marina for evaluation and management of melanoma. of the right clavicular region. The lesion has been present for at least 6 months. It has recently unchanged in size. It has not been bleeding. The lesion has not been pruritic. Patient does not have any other lesions of concern.     He does have a personal history of Basal Cell Carcinoma. Bladimir does not have significant subcutaneous mass, swelling in axilla, groin, or supraclavicular regions, cough, abdominal pain, jaundice, blood in stools, headaches, recent neurological changes or bone pain to indicate any metastatic disease.    Patient is not on Eliquis.    Past Medical History:   Diagnosis Date    Cancer (HCC) 12/15/2021    BCC right forehead    Hyperlipidemia     Hypertension     Tremor of left hand        Past Surgical History:   Procedure Laterality Date    BACK SURGERY  06/2020    FRACTURE SURGERY      wrist    MOHS SURGERY  12/04/2019    Scalp    MOHS SURGERY Right 12/15/2021    BCC right lower forehead    MOHS SURGERY Right 11/15/2022    lateral cheek    TOTAL HIP ARTHROPLASTY Left 08/2019       Cancer-related family history is not on file.    Social History:   Social History     Tobacco Use    Smoking status: Never    Smokeless tobacco: Never   Substance Use Topics    Alcohol use: Yes     Comment: Rarely      Tobacco cessation counseling provided as appropriate.    Allergies: Patient has no known allergies.    Current medications:   Current Outpatient Medications   Medication Sig Dispense Refill    furosemide (LASIX) 20 MG tablet FUROSEMIDE 20 MG TABS      traMADol (ULTRAM) 50 MG tablet TRAMADOL HCL 50 MG TABS      rosuvastatin (CRESTOR) 20 MG tablet 0.5 tablets      rosuvastatin (CRESTOR) 10 MG tablet Take 1 tablet by mouth nightly

## 2024-01-17 ENCOUNTER — TELEPHONE (OUTPATIENT)
Dept: SURGERY | Age: 79
End: 2024-01-17

## 2024-01-17 PROBLEM — L84 PRE-ULCERATIVE CORN OR CALLOUS: Status: ACTIVE | Noted: 2023-09-26

## 2024-01-17 PROBLEM — M16.12 PRIMARY OSTEOARTHRITIS OF LEFT HIP: Status: ACTIVE | Noted: 2019-05-28

## 2024-01-17 PROBLEM — M48.02 SPINAL STENOSIS, CERVICAL REGION: Status: ACTIVE | Noted: 2020-06-04

## 2024-01-17 PROBLEM — M79.672 PAIN IN BOTH FEET: Status: ACTIVE | Noted: 2023-09-26

## 2024-01-17 PROBLEM — E66.3 OVERWEIGHT: Status: ACTIVE | Noted: 2020-06-15

## 2024-01-17 PROBLEM — H25.13 AGE-RELATED NUCLEAR CATARACT, BILATERAL: Status: ACTIVE | Noted: 2024-01-17

## 2024-01-17 PROBLEM — M54.50 LUMBAR BACK PAIN: Status: ACTIVE | Noted: 2020-06-04

## 2024-01-17 PROBLEM — M51.26 HERNIATED LUMBAR INTERVERTEBRAL DISC: Status: ACTIVE | Noted: 2022-11-01

## 2024-01-17 PROBLEM — G20.A1 PARALYSIS AGITANS: Status: ACTIVE | Noted: 2021-05-21

## 2024-01-17 PROBLEM — M47.27 OSTEOARTHRITIS OF LUMBOSACRAL SPINE WITH RADICULOPATHY: Status: ACTIVE | Noted: 2020-06-06

## 2024-01-17 PROBLEM — M79.671 PAIN IN BOTH FEET: Status: ACTIVE | Noted: 2023-09-26

## 2024-01-17 PROBLEM — I50.20 HFREF (HEART FAILURE WITH REDUCED EJECTION FRACTION) (HCC): Status: ACTIVE | Noted: 2023-08-16

## 2024-01-17 PROBLEM — E78.5 HYPERLIPIDEMIA: Status: ACTIVE | Noted: 2022-03-14

## 2024-01-17 RX ORDER — TRAMADOL HYDROCHLORIDE 50 MG/1
TABLET ORAL
COMMUNITY
Start: 2023-05-18

## 2024-01-17 RX ORDER — METOPROLOL SUCCINATE 25 MG/1
12.5 TABLET, EXTENDED RELEASE ORAL DAILY
COMMUNITY
Start: 2023-06-15

## 2024-01-17 RX ORDER — ROSUVASTATIN CALCIUM 10 MG/1
10 TABLET, COATED ORAL NIGHTLY
COMMUNITY
Start: 2021-02-18

## 2024-01-17 RX ORDER — PSEUDOEPHEDRINE HCL 30 MG
100 TABLET ORAL 2 TIMES DAILY
COMMUNITY
Start: 2023-02-03

## 2024-01-17 RX ORDER — METHYLPREDNISOLONE 4 MG/1
TABLET ORAL
COMMUNITY
Start: 2021-05-20

## 2024-01-17 RX ORDER — METHOCARBAMOL 750 MG/1
1 TABLET, FILM COATED ORAL 3 TIMES DAILY
COMMUNITY
Start: 2023-02-14

## 2024-01-17 RX ORDER — ROSUVASTATIN CALCIUM 20 MG/1
10 TABLET, COATED ORAL
COMMUNITY
Start: 2023-05-17

## 2024-01-17 RX ORDER — FUROSEMIDE 20 MG/1
TABLET ORAL
COMMUNITY

## 2024-01-17 RX ORDER — METHOCARBAMOL 500 MG/1
500 TABLET, FILM COATED ORAL EVERY 6 HOURS PRN
COMMUNITY
Start: 2023-09-02

## 2024-01-17 RX ORDER — MELOXICAM 15 MG/1
1 TABLET ORAL DAILY
COMMUNITY
Start: 2022-03-28

## 2024-01-17 RX ORDER — ERGOCALCIFEROL 1.25 MG/1
CAPSULE ORAL
COMMUNITY
Start: 2023-01-06

## 2024-01-17 RX ORDER — PRAMIPEXOLE DIHYDROCHLORIDE 0.12 MG/1
0.12 TABLET ORAL 4 TIMES DAILY
COMMUNITY
Start: 2023-10-31 | End: 2024-07-27

## 2024-01-17 RX ORDER — METOPROLOL SUCCINATE 50 MG/1
25 TABLET, EXTENDED RELEASE ORAL
COMMUNITY
Start: 2023-05-17

## 2024-01-19 ENCOUNTER — OFFICE VISIT (OUTPATIENT)
Dept: SURGERY | Age: 79
End: 2024-01-19
Payer: MEDICARE

## 2024-01-19 VITALS
BODY MASS INDEX: 26.04 KG/M2 | SYSTOLIC BLOOD PRESSURE: 128 MMHG | HEART RATE: 69 BPM | HEIGHT: 75 IN | TEMPERATURE: 98 F | WEIGHT: 209.4 LBS | DIASTOLIC BLOOD PRESSURE: 79 MMHG

## 2024-01-19 DIAGNOSIS — C43.61 MALIGNANT MELANOMA OF RIGHT UPPER EXTREMITY INCLUDING SHOULDER (HCC): Primary | ICD-10-CM

## 2024-01-19 PROCEDURE — G8427 DOCREV CUR MEDS BY ELIG CLIN: HCPCS | Performed by: SURGERY

## 2024-01-19 PROCEDURE — 1123F ACP DISCUSS/DSCN MKR DOCD: CPT | Performed by: SURGERY

## 2024-01-19 PROCEDURE — 1036F TOBACCO NON-USER: CPT | Performed by: SURGERY

## 2024-01-19 PROCEDURE — G8484 FLU IMMUNIZE NO ADMIN: HCPCS | Performed by: SURGERY

## 2024-01-19 PROCEDURE — 99204 OFFICE O/P NEW MOD 45 MIN: CPT | Performed by: SURGERY

## 2024-01-19 PROCEDURE — G8419 CALC BMI OUT NRM PARAM NOF/U: HCPCS | Performed by: SURGERY

## 2024-01-19 PROCEDURE — 3074F SYST BP LT 130 MM HG: CPT | Performed by: SURGERY

## 2024-01-19 PROCEDURE — 3078F DIAST BP <80 MM HG: CPT | Performed by: SURGERY

## 2024-01-24 ENCOUNTER — TELEPHONE (OUTPATIENT)
Dept: SURGERY | Age: 79
End: 2024-01-24

## 2024-01-24 NOTE — TELEPHONE ENCOUNTER
RN contacted patient who states he would like surgery on a Monday in late February. RN will look at potential dates and inform patient.

## 2024-02-02 DIAGNOSIS — C43.61 MALIGNANT MELANOMA OF RIGHT UPPER EXTREMITY INCLUDING SHOULDER (HCC): Primary | ICD-10-CM

## 2024-02-02 NOTE — TELEPHONE ENCOUNTER
Pre-op Call     Pre-op phone call completed 2/2/2024 12:12 PM     Arrival date/time at [facility]: Wednesday February 28th. 09:00 for LYMPHOSCINTIGRAM.    Surgical site and laterality confirmed: Yes    [x] Reminded to be NPO after midnight    Pre-op H&P  [x] To be completed with PCP  [] To be completed day of surgery       Lymphoscintigram scheduled if needed:  Yes    Pre-op labs completed (if needed):   No    Blood thinning medications held for surgery: Not on any anticoagulation     Bowel prep reviewed: Not needed     Has ride home from hospital: Yes     Instructed to call with any questions or concerns. Verbalized understanding.

## 2024-02-23 NOTE — PROGRESS NOTES
[facility] PRE-SURGICAL TESTING INSTRUCTIONS                      PRIOR TO PROCEDURE DATE:    1. PLEASE FOLLOW ANY INSTRUCTIONS GIVEN TO YOU PER YOUR SURGEON.      2. Arrange for someone to drive you home and be with you for the first 24 hours after discharge for your safety after your procedure for which you received sedation. Ensure it is someone we can share information with regarding your discharge.     NOTE: At this time ONLY 2 ADULTS may accompany you   One person ENCOURAGED to stay at hospital entire time if outpatient surgery      3. You must contact your surgeon for instructions IF:  You are taking any blood thinners, aspirin, anti-inflammatory or vitamins.  There is a change in your physical condition such as a cold, fever, rash, cuts, sores, or any other infection, especially near your surgical site.    4. Do not drink alcohol the day before or day of your procedure.  Do not use any recreational marijuana at least 24 hours or street drugs (heroin, cocaine) at minimum 5 days prior to your procedure.     5. A Pre-Surgical History and Physical MUST be completed WITHIN 30 DAYS OR LESS prior to your procedure.by your Physician or an Urgent Care        THE DAY OF YOUR PROCEDURE:  1.  Follow instructions for ARRIVAL TIME as DIRECTED BY YOUR SURGEON.     2. Enter the MAIN entrance from [facility] and follow the signs to the free Parking Garage or  Parking (offered free of charge 7 am-5pm).      3. Enter the Main Entrance of the hospital (do not enter from the lower level of the parking garage). Upon entrance, check in with the  at the surgical information desk on your LEFT.   Bring your insurance card and photo ID to register      4. DO NOT EAT ANYTHING 8 hours prior to arrival for surgery.  You may have up to 8 ounces of water 4 hours prior to your arrival for surgery.   NOTE: ALL Gastric, Bariatric & Bowel surgery patients - you MUST follow your surgeon's instructions regarding

## 2024-02-23 NOTE — PROGRESS NOTES
Fax requested from [facility] PCP Akosua Yang, [phone], from 2/15 H&P with EKG Tracing at the [place] [facility] Office. /rd 2/23 1600

## 2024-02-27 ENCOUNTER — PREP FOR PROCEDURE (OUTPATIENT)
Dept: SURGERY | Age: 79
End: 2024-02-27

## 2024-02-27 ENCOUNTER — TELEPHONE (OUTPATIENT)
Dept: SURGERY | Age: 79
End: 2024-02-27

## 2024-02-27 DIAGNOSIS — C43.61 MALIGNANT MELANOMA OF RIGHT UPPER EXTREMITY INCLUDING SHOULDER (HCC): Primary | ICD-10-CM

## 2024-02-27 DIAGNOSIS — C43.59 MALIGNANT MELANOMA OF CHEST WALL (HCC): Primary | ICD-10-CM

## 2024-02-27 RX ORDER — LIDOCAINE AND PRILOCAINE 25; 25 MG/G; MG/G
CREAM TOPICAL ONCE
Status: CANCELLED | OUTPATIENT
Start: 2024-02-28

## 2024-02-27 NOTE — TELEPHONE ENCOUNTER
Patient verbalized an understanding:     Arrival date/time at [facility]: Wednesday February 28th. 09:00 for LYMPHOSCINTIGRAM.     Surgical site and laterality confirmed: Yes     [x] Reminded to be NPO after midnight     Pre-op H&P  [x] To be completed with PCP  [] To be completed day of surgery         Lymphoscintigram scheduled if needed:  Yes     Pre-op labs completed (if needed):   No     Blood thinning medications held for surgery: Not on any anticoagulation      Bowel prep reviewed: Not needed      Has ride home from hospital: Yes      Instructed to call with any questions or concerns. Verbalized understanding.

## 2024-02-28 ENCOUNTER — ANESTHESIA (OUTPATIENT)
Dept: OPERATING ROOM | Age: 79
End: 2024-02-28
Payer: MEDICARE

## 2024-02-28 ENCOUNTER — ANESTHESIA EVENT (OUTPATIENT)
Dept: OPERATING ROOM | Age: 79
End: 2024-02-28
Payer: MEDICARE

## 2024-02-28 ENCOUNTER — HOSPITAL ENCOUNTER (OUTPATIENT)
Age: 79
Setting detail: OUTPATIENT SURGERY
Discharge: HOME | End: 2024-02-28
Attending: SURGERY | Admitting: SURGERY
Payer: MEDICARE

## 2024-02-28 ENCOUNTER — TELEPHONE (OUTPATIENT)
Dept: SURGERY | Age: 79
End: 2024-02-28

## 2024-02-28 ENCOUNTER — HOSPITAL ENCOUNTER (OUTPATIENT)
Dept: NUCLEAR MEDICINE | Age: 79
Discharge: HOME | End: 2024-02-28
Attending: SURGERY
Payer: MEDICARE

## 2024-02-28 VITALS
HEART RATE: 56 BPM | DIASTOLIC BLOOD PRESSURE: 84 MMHG | SYSTOLIC BLOOD PRESSURE: 133 MMHG | BODY MASS INDEX: 26.61 KG/M2 | HEIGHT: 75 IN | TEMPERATURE: 97.1 F | RESPIRATION RATE: 12 BRPM | OXYGEN SATURATION: 95 % | WEIGHT: 214 LBS

## 2024-02-28 DIAGNOSIS — C43.61 MALIGNANT MELANOMA OF RIGHT UPPER EXTREMITY INCLUDING SHOULDER (HCC): ICD-10-CM

## 2024-02-28 DIAGNOSIS — D03.61 MELANOMA IN SITU OF RIGHT UPPER LIMB, INCLUDING SHOULDER (HCC): Primary | ICD-10-CM

## 2024-02-28 DIAGNOSIS — C43.59 MALIGNANT MELANOMA OF CHEST WALL (HCC): ICD-10-CM

## 2024-02-28 LAB
ANION GAP SERPL CALCULATED.3IONS-SCNC: 8 MMOL/L (ref 3–16)
APTT BLD: 28.1 SEC (ref 22.7–35.9)
BUN SERPL-MCNC: 14 MG/DL (ref 7–20)
CALCIUM SERPL-MCNC: 9 MG/DL (ref 8.3–10.6)
CHLORIDE SERPL-SCNC: 105 MMOL/L (ref 99–110)
CO2 SERPL-SCNC: 27 MMOL/L (ref 21–32)
CREAT SERPL-MCNC: 0.8 MG/DL (ref 0.8–1.3)
DEPRECATED RDW RBC AUTO: 15.8 % (ref 12.4–15.4)
GFR SERPLBLD CREATININE-BSD FMLA CKD-EPI: >60 ML/MIN/{1.73_M2}
GLUCOSE SERPL-MCNC: 96 MG/DL (ref 70–99)
HCT VFR BLD AUTO: 44.1 % (ref 40.5–52.5)
HGB BLD-MCNC: 14.6 G/DL (ref 13.5–17.5)
INR PPP: 0.96 (ref 0.84–1.16)
MCH RBC QN AUTO: 30.8 PG (ref 26–34)
MCHC RBC AUTO-ENTMCNC: 33 G/DL (ref 31–36)
MCV RBC AUTO: 93.1 FL (ref 80–100)
PLATELET # BLD AUTO: 164 K/UL (ref 135–450)
PMV BLD AUTO: 9.5 FL (ref 5–10.5)
POTASSIUM SERPL-SCNC: 4.2 MMOL/L (ref 3.5–5.1)
PROTHROMBIN TIME: 12.8 SEC (ref 11.5–14.8)
RBC # BLD AUTO: 4.74 M/UL (ref 4.2–5.9)
SODIUM SERPL-SCNC: 140 MMOL/L (ref 136–145)
WBC # BLD AUTO: 5.8 K/UL (ref 4–11)

## 2024-02-28 PROCEDURE — 7100000001 HC PACU RECOVERY - ADDTL 15 MIN: Performed by: SURGERY

## 2024-02-28 PROCEDURE — 88342 IMHCHEM/IMCYTCHM 1ST ANTB: CPT

## 2024-02-28 PROCEDURE — 38510 BIOPSY/REMOVAL LYMPH NODES: CPT | Performed by: SURGERY

## 2024-02-28 PROCEDURE — 3430000000 HC RX DIAGNOSTIC RADIOPHARMACEUTICAL: Performed by: SURGERY

## 2024-02-28 PROCEDURE — 88341 IMHCHEM/IMCYTCHM EA ADD ANTB: CPT

## 2024-02-28 PROCEDURE — 3600000004 HC SURGERY LEVEL 4 BASE: Performed by: SURGERY

## 2024-02-28 PROCEDURE — 13101 CMPLX RPR TRUNK 2.6-7.5 CM: CPT | Performed by: SURGERY

## 2024-02-28 PROCEDURE — 88307 TISSUE EXAM BY PATHOLOGIST: CPT

## 2024-02-28 PROCEDURE — 7100000011 HC PHASE II RECOVERY - ADDTL 15 MIN: Performed by: SURGERY

## 2024-02-28 PROCEDURE — 85610 PROTHROMBIN TIME: CPT

## 2024-02-28 PROCEDURE — 85730 THROMBOPLASTIN TIME PARTIAL: CPT

## 2024-02-28 PROCEDURE — 2580000003 HC RX 258: Performed by: SURGERY

## 2024-02-28 PROCEDURE — 6370000000 HC RX 637 (ALT 250 FOR IP): Performed by: NURSE PRACTITIONER

## 2024-02-28 PROCEDURE — 2709999900 HC NON-CHARGEABLE SUPPLY: Performed by: SURGERY

## 2024-02-28 PROCEDURE — 3600000014 HC SURGERY LEVEL 4 ADDTL 15MIN: Performed by: SURGERY

## 2024-02-28 PROCEDURE — A9520 TC99 TILMANOCEPT DIAG 0.5MCI: HCPCS | Performed by: SURGERY

## 2024-02-28 PROCEDURE — 2500000003 HC RX 250 WO HCPCS: Performed by: SURGERY

## 2024-02-28 PROCEDURE — 38900 IO MAP OF SENT LYMPH NODE: CPT | Performed by: SURGERY

## 2024-02-28 PROCEDURE — 13102 CMPLX RPR TRUNK ADDL 5CM/<: CPT | Performed by: SURGERY

## 2024-02-28 PROCEDURE — 2580000003 HC RX 258: Performed by: ANESTHESIOLOGY

## 2024-02-28 PROCEDURE — 6360000002 HC RX W HCPCS

## 2024-02-28 PROCEDURE — A4217 STERILE WATER/SALINE, 500 ML: HCPCS | Performed by: SURGERY

## 2024-02-28 PROCEDURE — 7100000000 HC PACU RECOVERY - FIRST 15 MIN: Performed by: SURGERY

## 2024-02-28 PROCEDURE — 7100000010 HC PHASE II RECOVERY - FIRST 15 MIN: Performed by: SURGERY

## 2024-02-28 PROCEDURE — 6360000002 HC RX W HCPCS: Performed by: NURSE PRACTITIONER

## 2024-02-28 PROCEDURE — 88305 TISSUE EXAM BY PATHOLOGIST: CPT

## 2024-02-28 PROCEDURE — 11606 EXC TR-EXT MAL+MARG >4 CM: CPT | Performed by: SURGERY

## 2024-02-28 PROCEDURE — 3700000000 HC ANESTHESIA ATTENDED CARE: Performed by: SURGERY

## 2024-02-28 PROCEDURE — 85027 COMPLETE CBC AUTOMATED: CPT

## 2024-02-28 PROCEDURE — 2580000003 HC RX 258: Performed by: NURSE PRACTITIONER

## 2024-02-28 PROCEDURE — 78195 LYMPH SYSTEM IMAGING: CPT

## 2024-02-28 PROCEDURE — 3700000001 HC ADD 15 MINUTES (ANESTHESIA): Performed by: SURGERY

## 2024-02-28 PROCEDURE — 80048 BASIC METABOLIC PNL TOTAL CA: CPT

## 2024-02-28 RX ORDER — DEXAMETHASONE SODIUM PHOSPHATE 4 MG/ML
INJECTION, SOLUTION INTRA-ARTICULAR; INTRALESIONAL; INTRAMUSCULAR; INTRAVENOUS; SOFT TISSUE PRN
Status: DISCONTINUED | OUTPATIENT
Start: 2024-02-28 | End: 2024-02-28 | Stop reason: SDUPTHER

## 2024-02-28 RX ORDER — DIPHENHYDRAMINE HYDROCHLORIDE 50 MG/ML
12.5 INJECTION INTRAMUSCULAR; INTRAVENOUS
Status: DISCONTINUED | OUTPATIENT
Start: 2024-02-28 | End: 2024-02-28 | Stop reason: HOSPADM

## 2024-02-28 RX ORDER — ONDANSETRON 2 MG/ML
INJECTION INTRAMUSCULAR; INTRAVENOUS PRN
Status: DISCONTINUED | OUTPATIENT
Start: 2024-02-28 | End: 2024-02-28 | Stop reason: SDUPTHER

## 2024-02-28 RX ORDER — PROPOFOL 10 MG/ML
INJECTION, EMULSION INTRAVENOUS PRN
Status: DISCONTINUED | OUTPATIENT
Start: 2024-02-28 | End: 2024-02-28 | Stop reason: SDUPTHER

## 2024-02-28 RX ORDER — SODIUM CHLORIDE 9 MG/ML
INJECTION, SOLUTION INTRAVENOUS PRN
Status: DISCONTINUED | OUTPATIENT
Start: 2024-02-28 | End: 2024-02-28 | Stop reason: HOSPADM

## 2024-02-28 RX ORDER — LORAZEPAM 2 MG/ML
0.5 INJECTION INTRAMUSCULAR
Status: DISCONTINUED | OUTPATIENT
Start: 2024-02-28 | End: 2024-02-28 | Stop reason: HOSPADM

## 2024-02-28 RX ORDER — DOCUSATE SODIUM 100 MG/1
100 CAPSULE, LIQUID FILLED ORAL 2 TIMES DAILY PRN
Qty: 28 CAPSULE | Refills: 0 | Status: SHIPPED | OUTPATIENT
Start: 2024-02-28 | End: 2024-03-13

## 2024-02-28 RX ORDER — IPRATROPIUM BROMIDE AND ALBUTEROL SULFATE 2.5; .5 MG/3ML; MG/3ML
1 SOLUTION RESPIRATORY (INHALATION)
Status: DISCONTINUED | OUTPATIENT
Start: 2024-02-28 | End: 2024-02-28 | Stop reason: HOSPADM

## 2024-02-28 RX ORDER — SODIUM CHLORIDE 0.9 % (FLUSH) 0.9 %
5-40 SYRINGE (ML) INJECTION PRN
Status: DISCONTINUED | OUTPATIENT
Start: 2024-02-28 | End: 2024-02-28 | Stop reason: HOSPADM

## 2024-02-28 RX ORDER — HYDROMORPHONE HYDROCHLORIDE 1 MG/ML
0.5 INJECTION, SOLUTION INTRAMUSCULAR; INTRAVENOUS; SUBCUTANEOUS EVERY 5 MIN PRN
Status: DISCONTINUED | OUTPATIENT
Start: 2024-02-28 | End: 2024-02-28 | Stop reason: HOSPADM

## 2024-02-28 RX ORDER — SODIUM CHLORIDE 0.9 % (FLUSH) 0.9 %
5-40 SYRINGE (ML) INJECTION EVERY 12 HOURS SCHEDULED
Status: DISCONTINUED | OUTPATIENT
Start: 2024-02-28 | End: 2024-02-28 | Stop reason: HOSPADM

## 2024-02-28 RX ORDER — PROCHLORPERAZINE EDISYLATE 5 MG/ML
5 INJECTION INTRAMUSCULAR; INTRAVENOUS
Status: DISCONTINUED | OUTPATIENT
Start: 2024-02-28 | End: 2024-02-28 | Stop reason: HOSPADM

## 2024-02-28 RX ORDER — FENTANYL CITRATE 50 UG/ML
INJECTION, SOLUTION INTRAMUSCULAR; INTRAVENOUS PRN
Status: DISCONTINUED | OUTPATIENT
Start: 2024-02-28 | End: 2024-02-28 | Stop reason: SDUPTHER

## 2024-02-28 RX ORDER — FENTANYL CITRATE 50 UG/ML
25 INJECTION, SOLUTION INTRAMUSCULAR; INTRAVENOUS EVERY 5 MIN PRN
Status: DISCONTINUED | OUTPATIENT
Start: 2024-02-28 | End: 2024-02-28 | Stop reason: HOSPADM

## 2024-02-28 RX ORDER — LIDOCAINE AND PRILOCAINE 25; 25 MG/G; MG/G
CREAM TOPICAL ONCE
Status: COMPLETED | OUTPATIENT
Start: 2024-02-28 | End: 2024-02-28

## 2024-02-28 RX ORDER — MAGNESIUM HYDROXIDE 1200 MG/15ML
LIQUID ORAL CONTINUOUS PRN
Status: DISCONTINUED | OUTPATIENT
Start: 2024-02-28 | End: 2024-02-28 | Stop reason: HOSPADM

## 2024-02-28 RX ORDER — EPINEPHRINE 1 MG/ML
INJECTION, SOLUTION, CONCENTRATE INTRAVENOUS PRN
Status: DISCONTINUED | OUTPATIENT
Start: 2024-02-28 | End: 2024-02-28 | Stop reason: SDUPTHER

## 2024-02-28 RX ORDER — BUPIVACAINE HYDROCHLORIDE AND EPINEPHRINE 5; 5 MG/ML; UG/ML
INJECTION, SOLUTION EPIDURAL; INTRACAUDAL; PERINEURAL PRN
Status: DISCONTINUED | OUTPATIENT
Start: 2024-02-28 | End: 2024-02-28 | Stop reason: HOSPADM

## 2024-02-28 RX ORDER — LABETALOL HYDROCHLORIDE 5 MG/ML
10 INJECTION, SOLUTION INTRAVENOUS
Status: DISCONTINUED | OUTPATIENT
Start: 2024-02-28 | End: 2024-02-28 | Stop reason: HOSPADM

## 2024-02-28 RX ORDER — MEPERIDINE HYDROCHLORIDE 25 MG/ML
12.5 INJECTION INTRAMUSCULAR; INTRAVENOUS; SUBCUTANEOUS EVERY 5 MIN PRN
Status: DISCONTINUED | OUTPATIENT
Start: 2024-02-28 | End: 2024-02-28 | Stop reason: HOSPADM

## 2024-02-28 RX ORDER — SODIUM CHLORIDE, SODIUM LACTATE, POTASSIUM CHLORIDE, CALCIUM CHLORIDE 600; 310; 30; 20 MG/100ML; MG/100ML; MG/100ML; MG/100ML
INJECTION, SOLUTION INTRAVENOUS CONTINUOUS
Status: DISCONTINUED | OUTPATIENT
Start: 2024-02-28 | End: 2024-02-28 | Stop reason: HOSPADM

## 2024-02-28 RX ORDER — OXYCODONE HYDROCHLORIDE 5 MG/1
5 TABLET ORAL EVERY 6 HOURS PRN
Qty: 12 TABLET | Refills: 0 | Status: SHIPPED | OUTPATIENT
Start: 2024-02-28 | End: 2024-03-04

## 2024-02-28 RX ORDER — ONDANSETRON 2 MG/ML
4 INJECTION INTRAMUSCULAR; INTRAVENOUS
Status: DISCONTINUED | OUTPATIENT
Start: 2024-02-28 | End: 2024-02-28 | Stop reason: HOSPADM

## 2024-02-28 RX ORDER — LIDOCAINE HYDROCHLORIDE 20 MG/ML
INJECTION, SOLUTION INTRAVENOUS PRN
Status: DISCONTINUED | OUTPATIENT
Start: 2024-02-28 | End: 2024-02-28 | Stop reason: SDUPTHER

## 2024-02-28 RX ADMIN — PROPOFOL 50 MG: 10 INJECTION, EMULSION INTRAVENOUS at 13:24

## 2024-02-28 RX ADMIN — PROPOFOL 40 MG: 10 INJECTION, EMULSION INTRAVENOUS at 14:02

## 2024-02-28 RX ADMIN — PROPOFOL 150 MG: 10 INJECTION, EMULSION INTRAVENOUS at 13:21

## 2024-02-28 RX ADMIN — LIDOCAINE HYDROCHLORIDE 100 MG: 20 INJECTION, SOLUTION INTRAVENOUS at 13:20

## 2024-02-28 RX ADMIN — ONDANSETRON 4 MG: 2 INJECTION INTRAMUSCULAR; INTRAVENOUS at 13:30

## 2024-02-28 RX ADMIN — EPINEPHRINE 5 MCG: 1 INJECTION, SOLUTION, CONCENTRATE INTRAVENOUS at 14:14

## 2024-02-28 RX ADMIN — DEXAMETHASONE SODIUM PHOSPHATE 4 MG: 4 INJECTION INTRA-ARTICULAR; INTRALESIONAL; INTRAMUSCULAR; INTRAVENOUS; SOFT TISSUE at 13:30

## 2024-02-28 RX ADMIN — EPINEPHRINE 5 MCG: 1 INJECTION, SOLUTION, CONCENTRATE INTRAVENOUS at 14:17

## 2024-02-28 RX ADMIN — SODIUM CHLORIDE, POTASSIUM CHLORIDE, SODIUM LACTATE AND CALCIUM CHLORIDE: 600; 310; 30; 20 INJECTION, SOLUTION INTRAVENOUS at 12:00

## 2024-02-28 RX ADMIN — EPINEPHRINE 5 MCG: 1 INJECTION, SOLUTION, CONCENTRATE INTRAVENOUS at 13:51

## 2024-02-28 RX ADMIN — LIDOCAINE AND PRILOCAINE: 25; 25 CREAM TOPICAL at 11:59

## 2024-02-28 RX ADMIN — EPINEPHRINE 10 MCG: 1 INJECTION, SOLUTION, CONCENTRATE INTRAVENOUS at 14:23

## 2024-02-28 RX ADMIN — TILMANOCEPT 825 MICRO CURIE: KIT at 11:03

## 2024-02-28 RX ADMIN — FENTANYL CITRATE 50 MCG: 50 INJECTION, SOLUTION INTRAMUSCULAR; INTRAVENOUS at 13:28

## 2024-02-28 RX ADMIN — FENTANYL CITRATE 50 MCG: 50 INJECTION, SOLUTION INTRAMUSCULAR; INTRAVENOUS at 13:57

## 2024-02-28 RX ADMIN — PROPOFOL 40 MG: 10 INJECTION, EMULSION INTRAVENOUS at 13:25

## 2024-02-28 RX ADMIN — SODIUM CHLORIDE 2000 MG: 900 INJECTION INTRAVENOUS at 13:19

## 2024-02-28 ASSESSMENT — PAIN SCALES - GENERAL
PAINLEVEL_OUTOF10: 0

## 2024-02-28 NOTE — ANESTHESIA POSTPROCEDURE EVALUATION
Department of Anesthesiology  Postprocedure Note    Patient: Bladimir Croft  MRN: [id number]  YOB: 1945  Date of evaluation: 2/28/2024    Procedure Summary       Date: 02/28/24 Room / Location: [place] [address] / [facility]    Anesthesia Start: 1315 Anesthesia Stop: 1448    Procedure: WIDE LOCAL EXCISION OF MELANOMA OF THE RIGHT CLAVICULAR REGION, SENTINEL LYMPH NODE BIOPSY (Shoulder) Diagnosis:       Melanoma of back (HCC)      (Melanoma of back (HCC) [C43.59])    Surgeons: Gaurav Bonilla MD Responsible Provider: Perry Aranda MD    Anesthesia Type: general ASA Status: 2            Anesthesia Type: No value filed.    Rosetta Phase I: Rosetta Score: 10    Rosetta Phase II: Rosetta Score: 10    Anesthesia Post Evaluation    Patient location during evaluation: PACU  Patient participation: complete - patient participated  Level of consciousness: awake and alert  Airway patency: patent  Nausea & Vomiting: no nausea and no vomiting  Cardiovascular status: hemodynamically stable  Respiratory status: acceptable  Hydration status: euvolemic  Multimodal analgesia pain management approach  Pain management: satisfactory to patient    No notable events documented.

## 2024-02-28 NOTE — PROGRESS NOTES
Ambulatory Surgery/Procedure Discharge Note    Vitals:    02/28/24 1552   BP: 133/84   Pulse: 56   Resp: 12   Temp: 97.1 °F (36.2 °C)   SpO2: 95%       In: 750 [I.V.:700]  Out: 5     Restroom use offered before discharge.  Yes    Pain assessment:  level of pain (1-10, 10 severe),   Pain Level: 0    Patient discharged to home with family.     Patient discharged to home/self care. Patient discharged via wheel chair by transporter to waiting family/S.O.       2/28/2024 4:34 PM

## 2024-02-28 NOTE — BRIEF OP NOTE
Brief Postoperative Note      Patient: Bladimir Croft  YOB: 1945  MRN: [id number]    Date of Procedure: 2/28/2024    Pre-Op Diagnosis Codes:     Melanoma of Right Supraclavicular Region    Post-Op Diagnosis: Same       Procedure(s):  WIDE LOCAL EXCISION OF MELANOMA OF THE RIGHT CLAVICULAR REGION, SENTINEL LYMPH NODE BIOPSY    Surgeon(s):  Gaurav Bonilla MD    Assistant:  Resident: Brian Guevara DPM; Lara Martinez DO    Anesthesia: General    Estimated Blood Loss (mL): 10    Complications: None    Specimens:   ID Type Source Tests Collected by Time Destination   A : A) RIGHT SUPRA CLAVICULAR MELANOMA, SHORT STITCH ANTERIOR, LONG STITCH LATERAL Tissue Tissue SURGICAL PATHOLOGY Gaurav Bonilla MD 2/28/2024 1359    B : B) RIGHT SUPRA CLAVICULAR SENTINEL LYMPH NODE Tissue Tissue SURGICAL PATHOLOGY Gaurav Bonilla MD 2/28/2024 1406        Implants: None      Drains: None    Findings: Right supraclavicular melanoma excised down to fascia. Supraclavicular sentinel lymph node excised and sent for pathology  Wide Local Excision for Primary Cutaneous Melanoma - Excision 1 (Shoulder - Right)  Operation performed with curative intent Yes   Original Breslow thickness of the lesion  1.7 mm (to the tenth of a millimeter)   Clinical margin width  (measured from the edge of the lesion or the prior excision scar) Other: 1.5 cm due to cosmetic/anatomic concerns   Depth of excision Full-thickness skin/subcutaneous tissue down to fascia (melanoma)         Electronically signed by Lara Martinez DO on 2/28/2024 at 2:47 PM

## 2024-02-28 NOTE — ANESTHESIA PRE PROCEDURE
Department of Anesthesiology  Preprocedure Note       Name:  Bladimir Croft   Age:  79 y.o.  :  1945                                          MRN:  [id number]         Date:  2024      Surgeon: Surgeon(s):  Gaurav Bonilla MD    Procedure: Procedure(s):  WIDE LOCAL EXCISION OF MELANOMA OF THE RIGHT CLAVICULAR REGION, SENTINEL LYMPH NODE BIOPSY  .    Medications prior to admission:   Prior to Admission medications    Medication Sig Start Date End Date Taking? Authorizing Provider   traMADol (ULTRAM) 50 MG tablet TRAMADOL HCL 50 MG TABS 23   Frantz España MD   rosuvastatin (CRESTOR) 10 MG tablet Take 1 tablet by mouth nightly 21   Frantz Esapña MD   empagliflozin (JARDIANCE) 10 MG tablet Take 1 tablet by mouth every morning 6/15/23   Frantz España MD   carbidopa-levodopa (SINEMET)  MG per tablet TAKE ONE (1) TABLET BY MOUTH EVERY 24 HOURS 3/17/21   Frantz España MD   lisinopril (PRINIVIL;ZESTRIL) 10 MG tablet  11/10/16   Frantz España MD       Current medications:    Current Facility-Administered Medications   Medication Dose Route Frequency Provider Last Rate Last Admin    ceFAZolin (ANCEF) 2,000 mg in sodium chloride 0.9 % 50 mL IVPB (mini-bag)  2,000 mg IntraVENous Once Gaurav Bonilla MD        lactated ringers IV soln infusion   IntraVENous Continuous Riki Fernandez MD        ceFAZolin (ANCEF) 2,000 mg in sodium chloride 0.9 % 50 mL IVPB (mini-bag)  2,000 mg IntraVENous On Call to OR Gauri Dupree APRN - CNP        lidocaine-prilocaine (EMLA) cream   Topical Once Gauri Dupree APRN - CNP           Allergies:  No Known Allergies    Problem List:    Patient Active Problem List   Diagnosis Code    Basal cell carcinoma of right upper forehead C44.319    Squamous cell carcinoma of scalp C44.42    Spinal stenosis, cervical region M48.02    Primary osteoarthritis of left hip M16.12    Primary hypertension I10

## 2024-02-28 NOTE — PROGRESS NOTES
PACU Transfer to [facility]    Vitals:    02/28/24 1545   BP: (!) 140/90   Pulse: 58   Resp: 12   Temp: 97.4 °F (36.3 °C)   SpO2: 94%         Intake/Output Summary (Last 24 hours) at 2/28/2024 1550  Last data filed at 2/28/2024 1448  Gross per 24 hour   Intake 750 ml   Output 5 ml   Net 745 ml       Pain assessment:  none  Pain Level: 0    Patient has all belongings at discharge from Pacu.   PACU RN called and updated patient's family.    Patient transferred via STRETCHER per ALEX to care of [facility] RN.

## 2024-02-28 NOTE — PROGRESS NOTES
Patient arrived to PACU post WIDE LOCAL EXCISION OF MELANOMA OF THE RIGHT CLAVICULAR REGION, SENTINEL LYMPH NODE BIOPSY  with Dr. Bonilla. VSS on arrival. CRNA gave PACU RN report at bedside stating no complications during procedure. Dressing to surgical site clean, dry and intact. Patient shows no signs of pain at this time. Will continue to monitor.

## 2024-02-28 NOTE — PROGRESS NOTES
To Nuc. Med per W/C with PCA, wife notified he would be at  test 1 hr , then still needed to get admitted in SDS pre-op prep etc.

## 2024-02-28 NOTE — TELEPHONE ENCOUNTER
Call placed to [facility] to request note be placed on pt chart re: Emla application upon arrival to [facility].

## 2024-02-28 NOTE — DISCHARGE INSTRUCTIONS
Discharge Instructions:    Diet:   You may resume a regular diet.    Wound Care:   There is a clear plastic dressing over your incision. Keep this on for as long as possible up to 5 days. You may shower with this on, trying to keep the area away from water as much as possible.  Once the clear plastic dressing is off, you will see special surgical glue over your incision. This will stay on for about 2-3 weeks. You may shower, but do not scrub the area, just let water gently run over the incision and pat it dry. No soaking (bathing, swimming, hot tubs) until incision is fully healed.   Please keep the sling in place when possible to remind you not to stretch your incision, but when necessary can use your right arm for activities of daily living (dressing, eating, using the restroom).  Call Dr. Bonilla's office with any questions or concerns.     Activity:   No heavy lifting greater than a milk jug until follow up. See above for other activity restrictions.    Pain management:   Unless informed of any restrictions by your primary care physician, please use your preferred over-the-counter pain reliever (Tylenol or Ibuprofen) as your primary pain medication. If you have pain that persists despite over-the-counter pain medications, you have been provided with a prescription for an opioid/narcotic pain reliever (Roxicodone).   No driving or operating machinery while taking opioid/narcotic medications.     Bowel Regimen:   Opioid/Narcotic pain relievers have a common side effect of constipation; therefore, you have been provided with a prescription for a stool softener, Docusate (Colace).  These medications are intended to help prevent you from experiencing this very common side effect and also help to regulate your bowels after surgery.   If your stools become too loose and/or frequent, decrease the Colace to one pill one time each day. If your stools are still loose after this modification, stop taking this medication  med list updated and carry it with in case of emergencies.    [x] Narcotic pain medications can cause the side effect of significant constipation.  You may want to add a stool softener to your postoperative medication schedule or speak to your surgeon on how best to manage this side effect.    NARCOTIC SAFETY:  Your pain medicine is only for you to take.  Safely store your medicines.  Store pills up high and out of reach of children and pets.  Ensure safety caps are snapped tightly  Keep track of how many pills you have left    Unused medication can be disposed of by taking them to a drop-off box or take-back program that is authorized by the [facility].  Access to a site near you can be found on the [employer] website ([employer]ivers[employer]gov).    If you have a CPAP machine, it is very important that you use it daily during all periods of sleep and daytime rest during your recovery at home.  Surgery and Anesthesia place a significant amount of stress on your body.  Using your CPAP will help keep you safe and lessen the negative effects of that stress.    FOLLOW-UP RECOVERY CARE:  [x]Call the office at [phone] for follow-up appointment and problems    Watch for these possible complications, symptoms, or side effects of anesthesia.  Call physician if they or any other problems occur:  Signs of INFECTION   > Fever over 101°     > Redness, swelling, hardness or warmth at the operative site   >Foul smelling or cloudy drainage at the operative site   Unrelieved PAIN  Unrelieved NAUSEA  Blood soaked dressing.  (Some oozing may be normal)  Inability to urinate      Numb, pale, blue, cold or tingling extremity      Physician:  Dr. Bonilla    The above instructions were reviewed with patient/significant other.  The following additional patient specific information was reviewed with the patient/significant other:  [x]Procedure/physician specific instructions  [x]Medication information sheet(S) including

## 2024-02-28 NOTE — PROGRESS NOTES
Arrived for surgery see alta.              H/P 2/15/24 needs up-date   [Fatigue] : fatigue [Cough] : cough [Negative] : Heme/Lymph

## 2024-03-04 NOTE — PROGRESS NOTES
2. Encounter for post surgical wound check            Doing well with no post-operative complications.   Pathology reviewed previously via phone call - reviewed again today and copy provided.     Discussed following up with both Dr. Bonilla and Dr. Marina. Also discussed monitoring perioperative site for any changes or new subcutaneous lumps/bumps. He verbalizes understanding that he should call the office with any new problems including but not limited to subcutaneous mass, palpable nodes in axilla/groin/supraclavicular regions, cough, abdominal pain, jaundice, blood in stool, headache, neurological changes, or bone pain.     Follow up with Dr. Marina as scheduled.   Follow up with Dr. Bonilla in 3 months.       Gauri Dupree DNP, APRN  3/15/2024, 10:27 AM

## 2024-03-11 ENCOUNTER — TELEPHONE (OUTPATIENT)
Dept: SURGERY | Age: 79
End: 2024-03-11

## 2024-03-11 NOTE — TELEPHONE ENCOUNTER
MA called patient to let him know that everything looked great on his pathology report,but had to leave a message, that there was no remaining melanoma and the lymph node was negative so all good new.MA told the patient we would see him on 3/15/24 and to please call with any questions or concerns.

## 2024-03-15 ENCOUNTER — OFFICE VISIT (OUTPATIENT)
Dept: SURGERY | Age: 79
End: 2024-03-15

## 2024-03-15 VITALS
DIASTOLIC BLOOD PRESSURE: 69 MMHG | TEMPERATURE: 97.2 F | HEIGHT: 75 IN | WEIGHT: 211 LBS | BODY MASS INDEX: 26.24 KG/M2 | SYSTOLIC BLOOD PRESSURE: 103 MMHG | HEART RATE: 80 BPM

## 2024-03-15 DIAGNOSIS — Z48.89 ENCOUNTER FOR POST SURGICAL WOUND CHECK: ICD-10-CM

## 2024-03-15 DIAGNOSIS — C43.61 MALIGNANT MELANOMA OF RIGHT UPPER EXTREMITY INCLUDING SHOULDER (HCC): Primary | ICD-10-CM

## 2024-03-15 PROBLEM — M54.50 LUMBAR BACK PAIN: Status: RESOLVED | Noted: 2020-06-04 | Resolved: 2024-03-15

## 2024-03-15 PROBLEM — M51.26 HERNIATED LUMBAR INTERVERTEBRAL DISC: Status: RESOLVED | Noted: 2022-11-01 | Resolved: 2024-03-15

## 2024-03-15 PROBLEM — C44.319 BASAL CELL CARCINOMA OF RIGHT FOREHEAD: Status: RESOLVED | Noted: 2017-03-28 | Resolved: 2024-03-15

## 2024-03-15 PROCEDURE — 99024 POSTOP FOLLOW-UP VISIT: CPT | Performed by: NURSE PRACTITIONER

## 2024-03-18 NOTE — OP NOTE
Operative Note      Patient: Bladimir Croft  YOB: 1945  MRN: [id number]    Date of Procedure: 2/28/2024    Pre-Op Diagnosis Codes:     * Melanoma of back (HCC) [C43.59]    Post-Op Diagnosis: Same       Procedure(s):  WIDE LOCAL EXCISION OF MELANOMA OF THE RIGHT CLAVICULAR REGION, SENTINEL LYMPH NODE BIOPSY    Surgeon(s):  Gaurav Bonilla MD    Assistant:   Resident: Brian Guevara DPM; Lara Martinez DO    Anesthesia: General    Estimated Blood Loss (mL): 10 ml    Complications: None    Specimens:   ID Type Source Tests Collected by Time Destination   A : A) RIGHT SUPRA CLAVICULAR MELANOMA, SHORT STITCH ANTERIOR, LONG STITCH LATERAL Tissue Tissue SURGICAL PATHOLOGY Gaurav Bonilla MD 2/28/2024 1352    B : B) RIGHT SUPRA CLAVICULAR SENTINEL LYMPH NODE Tissue Tissue SURGICAL PATHOLOGY Gaurav Bonilla MD 2/28/2024 1406      Findings: 1.3 mm melanoma    Wide Local Excision for Primary Cutaneous Melanoma - Excision 1 (Chest)  Operation performed with curative intent Yes   Original Breslow thickness of the lesion  1.3 mm (to the tenth of a millimeter)   Clinical margin width  (measured from the edge of the lesion or the prior excision scar) Other: 1.5 cm due to cosmetic/anatomic concerns   Depth of excision Full-thickness skin/subcutaneous tissue down to fascia (melanoma)     INDICATIONS FOR OPERATION:  Discussed with patient about wide excision of the melanoma and sentinel lymph node biopsy. The risks of surgery include infection, bleeding, recurrence of the lesion and seroma formation post-operatively. Patient verbalized understanding of the risks and benefits and wishes to proceed.    DETAILS OF PROCEDURE: Patient was identified in the preoperative area and brought to the operating room. General anesthesia given. Operative site is prepped and draped in a sterile manner. Timeout procedure was performed confirming the procedure, site and administration of antibiotics.

## 2024-06-03 NOTE — PROGRESS NOTES
[facility]  [address] [place] [address], [address]  [place] [address]  Dept: [phone]  Dept Fax: [phone]      Visit Date: 6/3/24     Subjective:     Bladimir Croft is a 79 y.o. male here for follow-up on melanoma of the right clavicular region which was excised on 2/28/24. Patient is followed by Dr. Benjamin Marina.      He is overall doing well. No new major medical issues since last visit. Today he denies new subcutaneous mass, headache, bone pain, cough, abdominal pain, weight loss, jaundice or suspicious new lesions. No nodules noted. Following regularly with dermatology. No new complaints. Review of systems is otherwise negative    Initial Biopsy Date 1/8/24   Thickness  1.3 mm   Ulceration  Not Identified   Regression  Present   Mitotic rate   <1 mitosis/mm2    Stage  pT2a     Surgical Excision Date: 2/28/24   [place] Lymph Node Biopsy: Negative  Decision DX: Class 1A    Objective:     Vitals:    06/14/24 0927   BP: 115/79   Pulse: 73   Temp: 97.2 °F (36.2 °C)          Constitutional: Patient is oriented to person, place, and time. No distress.   HENT:  Mucus membranes - moist. No scleral icterus.    Neck:  Normal range of motion. No visible lymphadenopathy present.    Pulmonary/Chest:  Effort normal. No respiratory distress. Bilateral symmetrical chest rise. No audible additional breath sounds.   Abdomen:  Soft, non-tender. No masses, no organomegaly.    Neurological:  Grossly intact motor and sensory systems on limited exam.   Skin: Skin is warm and dry. No rash noted. He is not diaphoretic.    Surgical site:               Incision normal: Yes              Surrounding nodularity: No              Abnormal pigmentation: No              Other lesions: No   Psychiatric: Normal mood and affect. His behavior is normal. Judgment and thought content normal.        Assessment/Plan:      Diagnosis Orders   1. Malignant melanoma of right upper extremity including shoulder

## 2024-06-13 PROBLEM — H25.813 COMBINED FORMS OF AGE-RELATED CATARACT, BILATERAL: Status: ACTIVE | Noted: 2024-01-17

## 2024-06-13 PROBLEM — I87.2 VENOUS INSUFFICIENCY (CHRONIC) (PERIPHERAL): Status: ACTIVE | Noted: 2024-06-13

## 2024-06-13 PROBLEM — R53.1 WEAKNESS: Status: ACTIVE | Noted: 2024-06-13

## 2024-06-13 RX ORDER — GABAPENTIN 100 MG/1
100 CAPSULE ORAL
COMMUNITY
Start: 2023-08-14

## 2024-06-13 RX ORDER — PRAMIPEXOLE DIHYDROCHLORIDE 0.12 MG/1
0.12 TABLET ORAL
COMMUNITY
Start: 2023-11-02

## 2024-06-13 RX ORDER — CARBOXYMETHYLCELLULOSE SODIUM 5 MG/ML
SOLUTION/ DROPS OPHTHALMIC
COMMUNITY
Start: 2024-04-05

## 2024-06-14 ENCOUNTER — OFFICE VISIT (OUTPATIENT)
Dept: SURGERY | Age: 79
End: 2024-06-14
Payer: MEDICARE

## 2024-06-14 VITALS
WEIGHT: 208 LBS | DIASTOLIC BLOOD PRESSURE: 79 MMHG | TEMPERATURE: 97.2 F | SYSTOLIC BLOOD PRESSURE: 115 MMHG | HEIGHT: 75 IN | HEART RATE: 73 BPM | BODY MASS INDEX: 25.86 KG/M2

## 2024-06-14 DIAGNOSIS — C43.61 MALIGNANT MELANOMA OF RIGHT UPPER EXTREMITY INCLUDING SHOULDER (HCC): Primary | ICD-10-CM

## 2024-06-14 PROCEDURE — 1123F ACP DISCUSS/DSCN MKR DOCD: CPT | Performed by: SURGERY

## 2024-06-14 PROCEDURE — 3074F SYST BP LT 130 MM HG: CPT | Performed by: SURGERY

## 2024-06-14 PROCEDURE — 99213 OFFICE O/P EST LOW 20 MIN: CPT | Performed by: SURGERY

## 2024-06-14 PROCEDURE — 3078F DIAST BP <80 MM HG: CPT | Performed by: SURGERY

## 2024-07-09 ENCOUNTER — OFFICE VISIT (OUTPATIENT)
Dept: DERMATOLOGY | Age: 79
End: 2024-07-09
Payer: MEDICARE

## 2024-07-09 DIAGNOSIS — L57.0 ACTINIC KERATOSIS: ICD-10-CM

## 2024-07-09 DIAGNOSIS — L82.0 INFLAMED SEBORRHEIC KERATOSIS: ICD-10-CM

## 2024-07-09 DIAGNOSIS — Z85.820 ENCOUNTER FOR FOLLOW-UP SURVEILLANCE OF MELANOMA: ICD-10-CM

## 2024-07-09 DIAGNOSIS — D48.5 NEOPLASM OF UNCERTAIN BEHAVIOR OF SKIN: Primary | ICD-10-CM

## 2024-07-09 DIAGNOSIS — Z08 ENCOUNTER FOR FOLLOW-UP SURVEILLANCE OF MELANOMA: ICD-10-CM

## 2024-07-09 PROCEDURE — 1123F ACP DISCUSS/DSCN MKR DOCD: CPT | Performed by: DERMATOLOGY

## 2024-07-09 PROCEDURE — 17110 DESTRUCTION B9 LES UP TO 14: CPT | Performed by: DERMATOLOGY

## 2024-07-09 PROCEDURE — 99212 OFFICE O/P EST SF 10 MIN: CPT | Performed by: DERMATOLOGY

## 2024-07-09 PROCEDURE — 11102 TANGNTL BX SKIN SINGLE LES: CPT | Performed by: DERMATOLOGY

## 2024-07-09 PROCEDURE — 17000 DESTRUCT PREMALG LESION: CPT | Performed by: DERMATOLOGY

## 2024-07-09 PROCEDURE — 11103 TANGNTL BX SKIN EA SEP/ADDL: CPT | Performed by: DERMATOLOGY

## 2024-07-09 NOTE — PROGRESS NOTES
[facility]  Benjamin Marina MD  [phone]      Bladimir Croft  1945    79 y.o. male     Date of Visit: 7/9/2024    Chief Complaint: skin lesions    History of Present Illness:    1.  He reports a persistent tender lesion on the left upper chest.  Unknown duration of a persistent pink lesion on the left lateral neck.    2.  He has few persistent scaly lesions on the face.    3.  He also reports a crusted lesion on the scalp that he recently picked at.    4.  He has a recent history of a T2a superficial spreading melanoma on the right lateral clavicular region (1.3 mm in thickness)-excised on 2/28/2024, sentinel lymph node biopsy negative.  Decision DX melanoma result with class IA signature.      Dermatologic history:     SCC in situ of the right medial lower leg-treated with curettage on 7/14/2023.  Metatypical infiltrating BCC on the left upper back-excised on 5/13/2022.  Focally infiltrative BCC on the right lower forehead-treated with Mohs by Dr. Hummel on 12/15/2021.     SCC on the left forearm-excised on 9/3/2021.  Superficial nodular BCC on the left medial cheek-treated with Mohs by Dr. Hummel on 4/28/2021.  Superficial BCC on the right upper back and nodular and superficial BCC on the left mid leg-treated with electrodesiccation and curettage on 4/23/2021.  SCC of the vertex scalp-treated with Mohs by Dr. Hummel on 12/4/2019.  Nodular BCC of the left superior lateral leg-excised 11/22/2019.  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  Superficial BCC on the right lower back and left medial lower leg both treated with curettage on 10/6/2017.  Infiltrating BCC of the right central forehead-treated with Mohs by Dr. Hummel on 3/28/2017.  Nodular BCC of the right upper forehead-treated with Mohs by Dr. Hummel on 3/28/2017.     Nodular BCC of the right mid upper back-excised on

## 2024-07-09 NOTE — PATIENT INSTRUCTIONS

## 2024-07-15 ENCOUNTER — TELEPHONE (OUTPATIENT)
Dept: DERMATOLOGY | Age: 79
End: 2024-07-15

## 2024-07-15 DIAGNOSIS — C44.42 SQUAMOUS CELL CARCINOMA OF NECK: Primary | ICD-10-CM

## 2024-07-26 ENCOUNTER — PROCEDURE VISIT (OUTPATIENT)
Dept: DERMATOLOGY | Age: 79
End: 2024-07-26

## 2024-07-26 DIAGNOSIS — C76.1 PRIMARY SQUAMOUS CELL CARCINOMA OF CHEST WALL (HCC): Primary | ICD-10-CM

## 2024-07-26 DIAGNOSIS — D48.5 NEOPLASM OF UNCERTAIN BEHAVIOR OF SKIN: ICD-10-CM

## 2024-07-26 NOTE — PROGRESS NOTES
[facility]  Benjamin Marina MD  [phone]      Bladimir Croft  1945    79 y.o. male     Date of Visit: 7/26/2024    Chief Complaint: SCC    History of Present Illness:    1.  Here today for treatment of an SCC on the left upper chest.    A.  Skin, left upper chest, shave biopsy:   - Well-differentiated squamous cell carcinoma.     2.  He also reports a persistent lesion near #1.     Review of Systems:  Gen: Feels well, good sense of health.  Heme: No abnormal bruising or bleeding.    Past Medical History, Family History, Surgical History, Medications and Allergies reviewed.    Past Medical History:   Diagnosis Date    Basal cell carcinoma of right upper forehead 03/28/2017    Cancer (HCC) 12/15/2021    BCC right forehead    Herniated lumbar intervertebral disc 11/01/2022    Hyperlipidemia     Hypertension     Tremor of left hand      Past Surgical History:   Procedure Laterality Date    BACK SURGERY  06/2020    FRACTURE SURGERY      wrist    MOHS SURGERY  12/04/2019    Scalp    MOHS SURGERY Right 12/15/2021    BCC right lower forehead    MOHS SURGERY Right 11/15/2022    lateral cheek    SHOULDER SURGERY N/A 2/28/2024    WIDE LOCAL EXCISION OF MELANOMA OF THE RIGHT CLAVICULAR REGION, SENTINEL LYMPH NODE BIOPSY performed by Gaurav Bonilla MD at [facility] OR    TOTAL HIP ARTHROPLASTY Left 08/2019       No Known Allergies  Outpatient Medications Marked as Taking for the 7/26/24 encounter (Procedure visit) with Benjamin Marina MD   Medication Sig Dispense Refill    carboxymethylcellulose (REFRESH PLUS) 0.5 % SOLN ophthalmic solution Apply to eye      rosuvastatin (CRESTOR) 10 MG tablet Take 1 tablet by mouth nightly      carbidopa-levodopa (SINEMET)  MG per tablet TAKE ONE (1) TABLET BY MOUTH EVERY 24 HOURS      lisinopril (PRINIVIL;ZESTRIL) 10 MG tablet            Physical Examination       Well appearing.    1.  Left upper chest with a 6 mm crusted pink papule.     2.

## 2024-08-14 ENCOUNTER — PROCEDURE VISIT (OUTPATIENT)
Dept: SURGERY | Age: 79
End: 2024-08-14

## 2024-08-14 VITALS — SYSTOLIC BLOOD PRESSURE: 110 MMHG | HEART RATE: 75 BPM | DIASTOLIC BLOOD PRESSURE: 78 MMHG

## 2024-08-14 DIAGNOSIS — C44.42 SQUAMOUS CELL CARCINOMA OF NECK: Primary | ICD-10-CM

## 2024-08-14 NOTE — PROGRESS NOTES
MOHS PROCEDURE NOTE    PHYSICIAN:  Ayaka Hummel MD, Who operated in two distinct and integrated capacities as the surgeon removing the tissue and as the pathologist examining the tissue.    ASSISTANT: Greg Babcock RN and Anna Kc MA      REFERRING PROVIDER:   Benjamin Marina MD     PREOPERATIVE DIAGNOSIS: Invasive well-differentiated Squamous Cell Carcinoma     SPECIFIC MOHS INDICATIONS:  location and need for tissue conservation    AUC SCORIN/9    POSTOPERATIVE DIAGNOSIS: SAME    LOCATION: Left lateral neck    OPERATIVE PROCEDURE:  MOHS MICROGRAPHIC SURGERY    RECONSTRUCTION OF DEFECT: Intermediate layered closure    PREOPERATIVE SIZE: 12x7 MM    DEFECT SIZE: 15x10 MM    LENGTH OF REPAIRED WOUND/SIZE OF FLAP/SIZE OF GRAFT:  40 MM    ANESTHESIA: 8 mL 1% lidocaine with epinephrine 1:100,000 buffered.     EBL:  MINIMAL    DURATION OF PROCEDURE:  1 HOUR    POSTOPERATIVE OBSERVATION: 0.5 HOUR    SPECIMENS:  SEE MOHS MAP    COMPLICATIONS:  NONE    DESCRIPTION OF PROCEDURE:  The patient was given a mirror, as appropriate, and the biopsy site was identified, marked with a surgical marking pen, and verified by the patient.   Options for treatment were discussed and the patient was informed that Mohs surgery was the selected treatment based on its lower recurrence rate, given the features listed above, as compared to other treatment modalities such as excision, radiation, or curettage, and agreed with this treatment plan.  Risks and benefits including bruising, swelling, bleeding, infection, nerve injury, recurrence, and scarring were discussed with the patient prior to the procedure and a written consent detailing these and other risks was reviewed with the patient and signed.    There was a time out for person and procedure verification.  The surgical site was prepped with an antiseptic solution.  Application of an antiseptic solution was repeated before each surgical stage.      Stage I:  The

## 2024-08-14 NOTE — PROGRESS NOTES
PRE-PROCEDURE SCREENING    Pacemaker/ICD: No  Difficulty with numbing in the past: No  Local Anesthesia Reaction/passing out: No  Latex or adhesive allergy:  No  Any history of reaction to suture or skin glue:  no  Bleeding/Clotting Disorders: No  Anticoagulant Therapy: No  Joint prosthesis: Yes - LT THR - 2018  Artificial Heart Valve: No  Stroke or Seizures: No  Organ Transplant or Lymphoma: No  Immunosuppression: No  Respiratory Problems: No    (PT has Parkinson's)

## 2024-08-15 ENCOUNTER — TELEPHONE (OUTPATIENT)
Dept: SURGERY | Age: 79
End: 2024-08-15

## 2024-08-15 NOTE — TELEPHONE ENCOUNTER
The patient was in the office on 8/14/24 for Mohs located on the left lateral neck with ILC repair.  The patient tolerated the procedure well and left the office in good condition.    Pain level on post-operative day 1:  none     Any bleeding episode that required pressure to be held, bandage change or a call to the office or MD?  no     Any other issues?:  no    A post-operative telephone call was placed at [facility] in order to check on the patient's recovery process.  The patient reported doing well and had no complaints other than those listed above, if any.  All of the patient's questions were answered.

## 2024-11-18 NOTE — PROGRESS NOTES
[facility]  [address] [place] [address], [address]  [place] [address]  Dept: [phone]  Dept Fax: [phone]      Visit Date: 12/10/2024     Subjective:     Bladimir Croft is a 79 y.o. male here for follow-up on melanoma of the right clavicular region which was excised on 2/28/24.     He is overall doing well. No new major medical issues since last visit. Today he denies new subcutaneous mass, headache, bone pain, cough, abdominal pain, weight loss, jaundice or suspicious new lesions. No nodules noted. Following regularly with dermatology.     Recent MOHS procedure by Dr. Ayaka Hummel for Invasive well-differentiated Squamous Cell Carcinoma of the left lateral neck.    Complains of some right lower extremity weakness for which he is currently doing PT - 2/2 bone spur in lower back which has been treated.     Has had a colonoscopy - approximately 4-5 years ago.     Initial Biopsy Date 1/8/24   Thickness  1.3 mm   Ulceration  Not Identified   Regression  Present   Mitotic rate  <1 mitosis/mm2   Stage  pT2a     Surgical Excision Date: 2/28/24   [place] Lymph Node Biopsy: Negative  Decision DX: Class 1A     Objective:     Vitals:    12/10/24 0921   BP: 96/63   Pulse: 80   Temp: 98.4 °F (36.9 °C)          Constitutional: Patient is oriented to person, place, and time. No distress.   HENT:  Mucus membranes - moist. No scleral icterus.    Neck:  Normal range of motion. No visible lymphadenopathy present.    Pulmonary/Chest:  Effort normal. No respiratory distress. Bilateral symmetrical chest rise. No audible additional breath sounds.   Abdomen:  Soft, non-tender. No masses, no organomegaly.    Neurological:  Grossly intact motor and sensory systems on limited exam.   Skin: Skin is warm and dry. No rash noted. He is not diaphoretic.    Surgical site:               Incision normal: Yes              Surrounding nodularity: No              Abnormal pigmentation: No              Other

## 2024-12-05 PROBLEM — M62.50 MUSCULAR ATROPHY: Status: ACTIVE | Noted: 2024-09-24

## 2024-12-05 PROBLEM — R20.0 NUMBNESS OF EXTREMITY: Status: ACTIVE | Noted: 2024-09-24

## 2024-12-05 PROBLEM — Z85.828 HISTORY OF BASAL CELL CARCINOMA: Status: ACTIVE | Noted: 2022-03-14

## 2024-12-05 RX ORDER — METOPROLOL SUCCINATE 50 MG/1
25 TABLET, EXTENDED RELEASE ORAL
COMMUNITY
Start: 2024-08-26

## 2024-12-10 ENCOUNTER — OFFICE VISIT (OUTPATIENT)
Dept: SURGERY | Age: 79
End: 2024-12-10
Payer: MEDICARE

## 2024-12-10 VITALS
HEIGHT: 75 IN | WEIGHT: 218 LBS | SYSTOLIC BLOOD PRESSURE: 96 MMHG | TEMPERATURE: 98.4 F | HEART RATE: 80 BPM | DIASTOLIC BLOOD PRESSURE: 63 MMHG | BODY MASS INDEX: 27.1 KG/M2

## 2024-12-10 DIAGNOSIS — C43.61 MALIGNANT MELANOMA OF RIGHT UPPER EXTREMITY INCLUDING SHOULDER (HCC): Primary | ICD-10-CM

## 2024-12-10 PROCEDURE — 1160F RVW MEDS BY RX/DR IN RCRD: CPT | Performed by: NURSE PRACTITIONER

## 2024-12-10 PROCEDURE — 99213 OFFICE O/P EST LOW 20 MIN: CPT | Performed by: NURSE PRACTITIONER

## 2024-12-10 PROCEDURE — 3078F DIAST BP <80 MM HG: CPT | Performed by: NURSE PRACTITIONER

## 2024-12-10 PROCEDURE — 1159F MED LIST DOCD IN RCRD: CPT | Performed by: NURSE PRACTITIONER

## 2024-12-10 PROCEDURE — 3074F SYST BP LT 130 MM HG: CPT | Performed by: NURSE PRACTITIONER

## 2024-12-10 PROCEDURE — 1123F ACP DISCUSS/DSCN MKR DOCD: CPT | Performed by: NURSE PRACTITIONER

## 2025-01-14 ENCOUNTER — OFFICE VISIT (OUTPATIENT)
Age: 80
End: 2025-01-14
Payer: MEDICARE

## 2025-01-14 DIAGNOSIS — D48.5 NEOPLASM OF UNCERTAIN BEHAVIOR OF SKIN: Primary | ICD-10-CM

## 2025-01-14 DIAGNOSIS — L57.0 ACTINIC KERATOSIS: ICD-10-CM

## 2025-01-14 PROCEDURE — 11103 TANGNTL BX SKIN EA SEP/ADDL: CPT | Performed by: DERMATOLOGY

## 2025-01-14 PROCEDURE — 11102 TANGNTL BX SKIN SINGLE LES: CPT | Performed by: DERMATOLOGY

## 2025-01-14 PROCEDURE — 17003 DESTRUCT PREMALG LES 2-14: CPT | Performed by: DERMATOLOGY

## 2025-01-14 PROCEDURE — 17000 DESTRUCT PREMALG LESION: CPT | Performed by: DERMATOLOGY

## 2025-01-14 NOTE — PATIENT INSTRUCTIONS

## 2025-01-14 NOTE — PROGRESS NOTES
[facility]  Benjamin Marina MD  [phone]      Bladimir Croft  1945    80 y.o. male     Date of Visit: 1/14/2025    Chief Complaint: Skin lesions    History of Present Illness:    1.  He presents today for several persistent tender and persistent lesions on the scalp, nose, right forearm and chest.    2.  He also has several persistent scaly lesions on the face and left shoulder.    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  Decision DX melanoma result with class IA signature.    Well-differentiated SCC on the left lateral neck-treated with Mohs by Dr. Hummel on 8/14/2024.  Well-differentiated SCC on the left upper chest-treated with electrodesiccation and curettage on 7/26/2024.  SCC in situ of the right medial lower leg-treated with curettage on 7/14/2023.  Metatypical infiltrating BCC on the left upper back-excised on 5/13/2022.  Focally infiltrative BCC on the right lower forehead-treated with Mohs by Dr. Hummel on 12/15/2021.     SCC on the left forearm-excised on 9/3/2021.  Superficial nodular BCC on the left medial cheek-treated with Mohs by Dr. Hummel on 4/28/2021.  Superficial BCC on the right upper back and nodular and superficial BCC on the left mid leg-treated with electrodesiccation and curettage on 4/23/2021.  SCC of the vertex scalp-treated with Mohs by Dr. Hummel on 12/4/2019.  Nodular BCC of the left superior lateral leg-excised 11/22/2019.  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  Superficial BCC on the right lower back and left medial lower leg both treated with curettage on 10/6/2017.  Infiltrating BCC of the right central forehead-treated with Mohs by Dr. Hummel on 3/28/2017.  Nodular BCC of the right upper

## 2025-01-17 LAB — DERMATOLOGY PATHOLOGY REPORT: ABNORMAL

## 2025-01-21 DIAGNOSIS — C44.42 SQUAMOUS CELL CARCINOMA OF SCALP: Primary | ICD-10-CM

## 2025-01-21 DIAGNOSIS — D04.4 SQUAMOUS CELL CARCINOMA IN SITU OF SCALP: ICD-10-CM

## 2025-01-21 DIAGNOSIS — C44.321 SQUAMOUS CELL CARCINOMA OF TIP OF NOSE: ICD-10-CM

## 2025-01-23 ENCOUNTER — TELEPHONE (OUTPATIENT)
Dept: SURGERY | Age: 80
End: 2025-01-23

## 2025-01-23 NOTE — TELEPHONE ENCOUNTER
I spoke with patient and offered 1/22/25 for Mohs surgery and he refused. I also offered 1/28/25 and he refused. States he has physical therapy and needs to go to that for his back.

## 2025-02-07 ENCOUNTER — PROCEDURE VISIT (OUTPATIENT)
Age: 80
End: 2025-02-07

## 2025-02-07 DIAGNOSIS — C44.622 SQUAMOUS CELL CARCINOMA OF SKIN OF RIGHT FOREARM: Primary | ICD-10-CM

## 2025-02-07 DIAGNOSIS — D04.5 SQUAMOUS CELL CARCINOMA IN SITU (SCCIS) OF SKIN OF CHEST: ICD-10-CM

## 2025-02-07 NOTE — PROGRESS NOTES
[facility]  Benjamin Marina MD  [phone]      Bladimir Croft  1945    80 y.o. male     Date of Visit: 2/7/2025    Chief Complaint: skin cancers    History of Present Illness:    He presents today for treatment of 2 squamous cell carcinomas:    ACCESSION   [id number]     DIAGNOSIS     A. RIGHT DISTAL EXTENSOR FOREARM   Moderately differentiated squamous cell carcinoma     F. RIGHT LATERAL CHEST   Squamous cell carcinoma in-situ     Pathologist Signature   Reba Mccullough MD       Review of Systems:  Gen: Feels well, good sense of health.      Past Medical History, Family History, Surgical History, Medications and Allergies reviewed.    Past Medical History:   Diagnosis Date    Basal cell carcinoma of right upper forehead 03/28/2017    Cancer (HCC) 12/15/2021    BCC right forehead    Herniated lumbar intervertebral disc 11/01/2022    Hyperlipidemia     Hypertension     Parkinson's disease (HCC)     Tremor of left hand      Past Surgical History:   Procedure Laterality Date    BACK SURGERY  06/2020    FRACTURE SURGERY      wrist    MOHS SURGERY  12/04/2019    Scalp    MOHS SURGERY Right 12/15/2021    BCC right lower forehead    MOHS SURGERY Right 11/15/2022    lateral cheek    MOHS SURGERY Left 08/14/2024    LT lateral neck - SCC well diff    SHOULDER SURGERY N/A 02/28/2024    WIDE LOCAL EXCISION OF MELANOMA OF THE RIGHT CLAVICULAR REGION, SENTINEL LYMPH NODE BIOPSY performed by Gaurav Bonilla MD at [facility] OR    TOTAL HIP ARTHROPLASTY Left 08/2019       Allergies   Allergen Reactions    No Known Allergies      Other Reaction(s): No known active allergies     Outpatient Medications Marked as Taking for the 2/7/25 encounter (Procedure visit) with Benjamin Marina MD   Medication Sig Dispense Refill    carboxymethylcellulose (REFRESH PLUS) 0.5 % SOLN ophthalmic solution Apply to eye      rosuvastatin (CRESTOR) 10 MG tablet Take 1 tablet by mouth nightly

## 2025-02-19 ENCOUNTER — PROCEDURE VISIT (OUTPATIENT)
Dept: SURGERY | Age: 80
End: 2025-02-19
Payer: MEDICARE

## 2025-02-19 VITALS — SYSTOLIC BLOOD PRESSURE: 124 MMHG | HEART RATE: 62 BPM | DIASTOLIC BLOOD PRESSURE: 58 MMHG

## 2025-02-19 DIAGNOSIS — C44.42 SQUAMOUS CELL CARCINOMA OF SCALP: Primary | ICD-10-CM

## 2025-02-19 DIAGNOSIS — D04.4 SQUAMOUS CELL CARCINOMA IN SITU (SCCIS) OF SCALP: ICD-10-CM

## 2025-02-19 PROCEDURE — 17312 MOHS ADDL STAGE: CPT | Performed by: DERMATOLOGY

## 2025-02-19 PROCEDURE — 12034 INTMD RPR S/TR/EXT 7.6-12.5: CPT | Performed by: DERMATOLOGY

## 2025-02-19 PROCEDURE — 17311 MOHS 1 STAGE H/N/HF/G: CPT | Performed by: DERMATOLOGY

## 2025-02-19 NOTE — PROGRESS NOTES
PRE-PROCEDURE SCREENING    Pacemaker/ICD: No  Difficulty with numbing in the past: No  Local Anesthesia Reaction/passing out: No  Latex or adhesive allergy:  No  Any history of reaction to suture or skin glue:  no  Bleeding/Clotting Disorders: No  Anticoagulant Therapy: No  Joint prosthesis: Yes - LT THR '2018  Artificial Heart Valve: No  Stroke or Seizures: No  Organ Transplant or Lymphoma: No  Immunosuppression: No  Respiratory Problems: No

## 2025-02-19 NOTE — PROGRESS NOTES
MOHS PROCEDURE NOTE    PHYSICIAN:  Ayaka Hummel MD, Who operated in two distinct and integrated capacities as the surgeon removing the tissue and as the pathologist examining the tissue.    ASSISTANT: Melisa Chisholm RN, DARYN LugoN, RN, Anna Kc MA     REFERRING PROVIDER:   Benjamin Marina MD    PREOPERATIVE DIAGNOSIS: Squamous Cell Carcinoma in Situ     SPECIFIC MOHS INDICATIONS:  location and need for tissue conservation    AUC SCORIN/9    POSTOPERATIVE DIAGNOSIS: SAME    LOCATION: Central superior parietal scalp    OPERATIVE PROCEDURE:  MOHS MICROGRAPHIC SURGERY    RECONSTRUCTION OF DEFECT: Intermediate layered closure    PREOPERATIVE SIZE: 11x10 MM    DEFECT SIZE: 14x12 MM    LENGTH OF REPAIRED WOUND/SIZE OF FLAP/SIZE OF GRAFT:  45 MM    ANESTHESIA:  5mL 1% lidocaine with epinephrine 1:100,000 buffered.     EBL:  MINIMAL    DURATION OF PROCEDURE:  2.5 HOURS    POSTOPERATIVE OBSERVATION: 1 HOUR    SPECIMENS:  SEE MOHS MAP    COMPLICATIONS:  NONE    DESCRIPTION OF PROCEDURE:  The patient was given a mirror, as appropriate, and the biopsy site was identified, marked with a surgical marking pen, and verified by the patient.   Options for treatment were discussed and the patient was informed that Mohs surgery was the selected treatment based on its lower recurrence rate, given the features listed above, as compared to other treatment modalities such as excision, radiation, or curettage, and agreed with this treatment plan.  Risks and benefits including bruising, swelling, bleeding, infection, nerve injury, recurrence, and scarring were discussed with the patient prior to the procedure and a written consent detailing these and other risks was reviewed with the patient and signed.    There was a time out for person and procedure verification.  The surgical site was prepped with an antiseptic solution.  Application of an antiseptic solution was repeated before each surgical stage.      Stage I:

## 2025-02-19 NOTE — PROGRESS NOTES
MOHS PROCEDURE NOTE    PHYSICIAN:  Ayaka Hummel MD, Who operated in two distinct and integrated capacities as the surgeon removing the tissue and as the pathologist examining the tissue.    ASSISTANT: Melisa Chisholm RN, DARYN LugoN, RN, Anna Kc MA     REFERRING PROVIDER:   Benjamin Marina MD    PREOPERATIVE DIAGNOSIS: Invasive moderately-differentiated Squamous Cell Carcinoma     SPECIFIC MOHS INDICATIONS:  location and need for tissue conservation    AUC SCORIN/9    POSTOPERATIVE DIAGNOSIS: SAME    LOCATION: Frontal scalp    OPERATIVE PROCEDURE:  MOHS MICROGRAPHIC SURGERY    RECONSTRUCTION OF DEFECT: Intermediate layered closure    PREOPERATIVE SIZE: 8x7 MM    DEFECT SIZE: 22x18 MM    LENGTH OF REPAIRED WOUND/SIZE OF FLAP/SIZE OF GRAFT:  55 MM    ANESTHESIA:  6mL 1% lidocaine with epinephrine 1:100,000 buffered.     EBL:  MINIMAL    DURATION OF PROCEDURE:  2.5 HOURS    POSTOPERATIVE OBSERVATION: 1 HOUR    SPECIMENS:  SEE MOHS MAP    COMPLICATIONS:  NONE    DESCRIPTION OF PROCEDURE:  The patient was given a mirror, as appropriate, and the biopsy site was identified, marked with a surgical marking pen, and verified by the patient.   Options for treatment were discussed and the patient was informed that Mohs surgery was the selected treatment based on its lower recurrence rate, given the features listed above, as compared to other treatment modalities such as excision, radiation, or curettage, and agreed with this treatment plan.  Risks and benefits including bruising, swelling, bleeding, infection, nerve injury, recurrence, and scarring were discussed with the patient prior to the procedure and a written consent detailing these and other risks was reviewed with the patient and signed.    There was a time out for person and procedure verification.  The surgical site was prepped with an antiseptic solution.  Application of an antiseptic solution was repeated before each surgical stage.      Stage

## 2025-02-21 ENCOUNTER — TELEPHONE (OUTPATIENT)
Dept: SURGERY | Age: 80
End: 2025-02-21

## 2025-02-21 NOTE — TELEPHONE ENCOUNTER
The patient was in the office on 2/19/2025 for mohs surgery located on the central sup. Parietal scalp & Frontal scalp with ILC repair.  The patient tolerated the procedure well and left the office in good condition.    Pain level on post-operative day 1:  level of pain (1-10, 10 severe), none    Any bleeding episode that required pressure to be held, bandage change or a call to the office or MD?  no     Any other issues?:  no    A post-operative telephone call was placed at 1220 in order to check on the patient's recovery process.  The patient reported doing well and had no complaints other than those listed above, if any.  All of the patient's questions were answered.

## 2025-03-12 ENCOUNTER — PROCEDURE VISIT (OUTPATIENT)
Dept: SURGERY | Age: 80
End: 2025-03-12
Payer: MEDICARE

## 2025-03-12 VITALS — SYSTOLIC BLOOD PRESSURE: 112 MMHG | HEART RATE: 68 BPM | DIASTOLIC BLOOD PRESSURE: 70 MMHG

## 2025-03-12 DIAGNOSIS — Z48.02 VISIT FOR SUTURE REMOVAL: ICD-10-CM

## 2025-03-12 DIAGNOSIS — C44.321 SQUAMOUS CELL CANCER OF SKIN OF NASAL TIP: Primary | ICD-10-CM

## 2025-03-12 PROCEDURE — 17311 MOHS 1 STAGE H/N/HF/G: CPT | Performed by: DERMATOLOGY

## 2025-03-12 PROCEDURE — 14060 TIS TRNFR E/N/E/L 10 SQ CM/<: CPT | Performed by: DERMATOLOGY

## 2025-03-12 NOTE — PROGRESS NOTES
S:  The patient is here for suture removal s/p Mohs surgery on the Scalp x 2 and Intermediate layered closure repair, 3 week(s) ago. The site appears well-healed without signs of infection (redness, pain or discharge). The sutures were removed. Both sites look great.  Wound care and activity instructions given.  The patient was scheduled for follow-up as needed for scar/wound check.  The patient was scheduled for f/u with General Dermatology per their instructions.

## 2025-03-12 NOTE — PROGRESS NOTES
PRE-PROCEDURE SCREENING    Pacemaker/ICD: No  Difficulty with numbing in the past: No  Local Anesthesia Reaction/passing out: No  Latex or adhesive allergy:  No  Any history of reaction to suture or skin glue:  no  Bleeding/Clotting Disorders: No  Anticoagulant Therapy: No  Joint prosthesis: Yes, L hip approx 2019  Artificial Heart Valve: No  Stroke or Seizures: No  Organ Transplant or Lymphoma: No  Immunosuppression: No  Respiratory Problems: No

## 2025-03-12 NOTE — PROGRESS NOTES
MOHS PROCEDURE NOTE    PHYSICIAN:  Ayaka Hummel MD, Who operated in two distinct and integrated capacities as the surgeon removing the tissue and as the pathologist examining the tissue.    ASSISTANT: Melisa Chisholm RN, Greg Babcock RN     REFERRING PROVIDER:   Benjamin Marina MD    PREOPERATIVE DIAGNOSIS: Invasive moderately-differentiated Squamous Cell Carcinoma     SPECIFIC MOHS INDICATIONS:  location and need for tissue conservation    AUC SCORIN/9    POSTOPERATIVE DIAGNOSIS: SAME    LOCATION: Left nasal tip    OPERATIVE PROCEDURE:  MOHS MICROGRAPHIC SURGERY    RECONSTRUCTION OF DEFECT: V to Y Advancement Flap (non-tunneled island pedicle flap)    PREOPERATIVE SIZE: 6x6 MM    DEFECT SIZE: 9x8 MM    LENGTH OF REPAIRED WOUND/SIZE OF FLAP/SIZE OF GRAFT:  1.92cm2    ANESTHESIA:  4mL 1% lidocaine with epinephrine 1:100,000 buffered.     EBL:  MINIMAL    DURATION OF PROCEDURE:  40 MINUTES    POSTOPERATIVE OBSERVATION: 40 MINUTES    SPECIMENS:  SEE MOHS MAP    COMPLICATIONS:  NONE    DESCRIPTION OF PROCEDURE:  The patient was given a mirror, as appropriate, and the biopsy site was identified, marked with a surgical marking pen, and verified by the patient.   Options for treatment were discussed and the patient was informed that Mohs surgery was the selected treatment based on its lower recurrence rate, given the features listed above, as compared to other treatment modalities such as excision, radiation, or curettage, and agreed with this treatment plan.  Risks and benefits including bruising, swelling, bleeding, infection, nerve injury, recurrence, and scarring were discussed with the patient prior to the procedure and a written consent detailing these and other risks was reviewed with the patient and signed.    There was a time out for person and procedure verification.  The surgical site was prepped with an antiseptic solution.  Application of an antiseptic solution was repeated before each surgical

## 2025-03-12 NOTE — PATIENT INSTRUCTIONS
[facility] Office Hours:    Monday-Thursday  7:30 AM-4:30 PM    Friday  9:00 AM-1:00 PM       POST-OPERATIVE CARE FOR STITCHES  Bandage change after 48 hours    CARING FOR YOUR SURGICAL SITE  The bandage should remain on and completely dry for 48 hours. Do NOT get the bandage wet.  After the first 48 hours, gently remove the remaining part of the bandage. It can be helpful to moisten the bandage edges in the shower. Steri strips may still be on the wound. It is ok, they will fall off slowly with the daily bandage changes.  Gently clean the wound daily with mild soap and water. Try to clean off crust and debris.   Dry (pat) the area with a clean Q-tip or gauze.   Apply a layer of Vaseline/ Aquaphor (or Bacitracin if your doctor recommends) to the wound area only.  Cut a piece of Telfa (or any non-stick dressing) to fit just over the wound and secure it with paper tape. If the wound is small you may use a Band- Aid. Keep area covered for a total of 1 week(s).  If the dressing comes off or if you have questions, or concerns about the dressing, please call the office for instructions!    POST OPERATIVE INSTRUCTIONS    Activity: Do not lift anything heavier than a gallon of milk for 1 week. Also, avoid strenuous activity such as running, power walking or contact sports.  Eating and drinking: Do not drink alcohol for 48 hours after your procedure. Alcohol increases the chances of bleeding.  Medicines   -If you have discomfort, take Acetaminophen (Tylenol or Extra Strength Tylenol). Follow the instructions and warning on the bottle.  -If your doctor has prescribed you an Aspirin daily, please keep taking it. Do not take extra Aspirin or medicines containing Aspirin (such as [place] and Excedrin) for 48 hours after your procedure.    Bleeding: If bleeding occurs, DO NOT remove the bandage. Put firm pressure on the area with gauze for 20 minutes without peeking. If the bleeding continues, apply

## 2025-03-13 ENCOUNTER — TELEPHONE (OUTPATIENT)
Dept: SURGERY | Age: 80
End: 2025-03-13

## 2025-03-13 NOTE — TELEPHONE ENCOUNTER
The patient was in the office on 3/12/25 for mohs surgery located on the left nasal tip with v to y adv flap repair.  The patient tolerated the procedure well and left the office in good condition.    A post-operative telephone call was placed at [phone] in order to check on the patient's recovery process.  The patient was not able to be reached and a phone message was left.

## 2025-03-19 ENCOUNTER — OFFICE VISIT (OUTPATIENT)
Dept: SURGERY | Age: 80
End: 2025-03-19

## 2025-03-19 DIAGNOSIS — Z48.02 VISIT FOR SUTURE REMOVAL: Primary | ICD-10-CM

## 2025-03-19 PROCEDURE — 99024 POSTOP FOLLOW-UP VISIT: CPT | Performed by: DERMATOLOGY

## 2025-03-19 NOTE — PROGRESS NOTES
S:  The patient is here for suture removal s/p Mohs surgery on the left nasal tip and V to Y Advancement Flap (non-tunneled island pedicle flap) repair, 1 week(s) ago. The site appears well-healed without signs of infection (redness, pain or discharge). The sutures were removed.  Flap looks great, no issues. Wound care and activity instructions given.  The patient was scheduled for follow-up prn for scar/wound check.  The patient was scheduled for f/u with General Dermatology per their instructions.    Electronically signed by Melisa Chisholm RN on 3/19/2025 at 2:47 PM    IMelisa RN, am scribing for and in the presence of Dr.Emily Hummel,3/19/2025.    IAyaka MD,  personally performed the services described in this documentation as scribed by Melisa Chisholm RN  in my presence, and it is both accurate and complete.     Electronically signed by Ayaka Hummel MD on 3/19/2025 at 3:00 PM

## 2025-03-19 NOTE — PATIENT INSTRUCTIONS
[facility] Office Hours:    Monday-Thursday  7:30 AM-4:30 PM    Friday  9:00 AM-1:00 PM     WOUND CARE AFTER SUTURE REMOVAL    After your stiches have been removed, your scar is still very fragile. In fact, scars continue to change and evolve, what we call remodel, for about a year after your procedure.  Follow the following steps below to ensure that your scar heals well.    Instructions    1. If Steri-strips were applied, keep them on until they fall off on their own.  2. Protect your scar from the sun. Use a sunscreen or bandage to cover your scar. Sun exposure can cause your scar to become discolored and appear red or brown.  3. To help soften your scar more rapidly, it is helpful, but not necessary, for you to   massage the scar gently each night for twenty minutes.   4. “Spitting” suture. Occasionally, an inside suture (stitch) does not completely dissolve. When this happens, (generally 4-8 weeks after surgery), it causes a bump or “pimple” to form on the scar. This is easily removed and is not at all serious. It   does not mean the skin cancer has returned. Contact us if it happens, but do not be alarmed.      5. If you scar becomes tender, itchy or becomes very large, let Dr. Hummel know.  There    are treatments that can improve the appearance of your scar or help make it more comfortable.

## 2025-04-09 PROBLEM — M96.1 POSTLAMINECTOMY SYNDROME: Status: ACTIVE | Noted: 2025-04-09

## 2025-04-09 PROBLEM — M54.16 LUMBAR RADICULOPATHY: Status: ACTIVE | Noted: 2025-04-09

## 2025-04-22 ENCOUNTER — OFFICE VISIT (OUTPATIENT)
Age: 80
End: 2025-04-22

## 2025-04-22 DIAGNOSIS — L57.0 ACTINIC KERATOSIS: ICD-10-CM

## 2025-04-22 DIAGNOSIS — C44.619 BASAL CELL CARCINOMA (BCC) OF LEFT UPPER ARM: ICD-10-CM

## 2025-04-22 DIAGNOSIS — C44.629 SCC (SQUAMOUS CELL CARCINOMA), SHOULDER, LEFT: Primary | ICD-10-CM

## 2025-04-22 NOTE — PATIENT INSTRUCTIONS
Biopsy Wound Care Instructions    Keep the bandage in place for 24 hours.   Cleanse the wound with mild soapy water daily  Gently dry the area.  Apply Vaseline or petroleum jelly to the wound using a cotton tipped applicator.  Cover with a clean bandage.  Repeat this process until the biopsy site is healed.  If you had stitches placed, continue treating the site until the stitches are removed. Remember to make an appointment to return to have your stitches removed by our staff.  You may shower and bathe as usual.       ** Biopsy results generally take around 7 business days to come back.  If you have not heard from us by then, please call the office at [phone].    *Please note that biopsy results are released to both the patient and physician at the same time in [facility].  Please allow time for your physician to review the results.  One of our staff members will reach out to you with the results and plan.

## 2025-04-22 NOTE — PROGRESS NOTES
[facility]  Benjamin Marina MD  [phone]      Bladimir Croft  1945    80 y.o. male     Date of Visit: 4/22/2025    Chief Complaint: skin lesions of concern    History of Present Illness:    1.  He presents today for a 3-week history of a painful lesion on the left shoulder.    2.  He also has several persistent scaly lesions on the scalp and face.    3.  Unknown duration of an asymptomatic lesion on the left upper arm.     Dermatologic history:     He has a history of a T2a superficial spreading melanoma on the right lateral clavicular region (1.3 mm in thickness)-excised on 2/28/2024, sentinel lymph node biopsy negative.  Decision DX melanoma result with class IA signature.    Invasive moderately differentiated SCC of the left nasal tip-treated with Mohs by Dr. Hummel on 3/12/2025.  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 Hummel on 2/19/2025.  Invasive moderately differentiated SCC of the frontal scalp-treated with Mohs by Dr. Hummel on 2/19/2025.  Moderately differentiated SCC of the right distal extensor forearm-treated with electrodesiccation and curettage on 2/7/2025.  SCC in situ of the right lateral chest wall-treated with curettage on 2/7/2025.  Well-differentiated SCC on the left lateral neck-treated with Mohs by Dr. Hummel on 8/14/2024.  Well-differentiated SCC on the left upper chest-treated with electrodesiccation and curettage on 7/26/2024.  SCC in situ of the right medial lower leg-treated with curettage on 7/14/2023.  Metatypical infiltrating BCC on the left upper back-excised on 5/13/2022.  Focally infiltrative BCC on the right lower forehead-treated with Mohs by Dr. Hummel on 12/15/2021.     SCC on the left forearm-excised on 9/3/2021.  Superficial nodular BCC on the left medial cheek-treated with Mohs by Dr. Hummel on 4/28/2021.  Superficial BCC on the right upper back and nodular and superficial BCC on the

## 2025-04-25 ENCOUNTER — RESULTS FOLLOW-UP (OUTPATIENT)
Age: 80
End: 2025-04-25

## 2025-04-25 LAB — DERMATOLOGY PATHOLOGY REPORT: ABNORMAL

## 2025-06-03 NOTE — PROGRESS NOTES
Yes              Surrounding nodularity: No              Abnormal pigmentation: No              Other lesions: No   Psychiatric: Normal mood and affect. His behavior is normal. Judgment and thought content normal.      Pathology 4/22/25 DIAGNOSIS     A. LEFT SUPERIOR SHOULDER   Moderately differentiated squamous cell carcinoma     B. LEFT UPPER ARM   Focally infiltrating basal cell carcinoma, pigmented        Assessment/Plan:      Diagnosis Orders   1. Malignant melanoma of right upper extremity including shoulder (HCC)        He is doing well post-operatively with no complaints or issues at site of melanoma excision.     The patient was reminded to avoid excessive sun exposure in the future. Specific techniques for doing this based on the public health measures recommended the by the Melan[facility] [place] were given, including: seeking shade when outdoors, wearing sun-protective clothing that covers the back, shoulders, arms, and legs; wearing a broad-brimmed hat, applying broad-spectrum sunscreen with SPF of 50+ every 2 hours and after swimming or exercise, and wearing wrap-around sunglasses.     Importance of following-up with surgeon for routine monitoring of excision site/area as well as following-up with dermatology for complete skin checks reviewed. He is at higher risk for developing new melanoma in the future. Symptoms and signs of recurrence explained including headache, jaundice, cough, abdominal pain, and blood in stool.     Recommend routine follow up with dermatologist.     Follow up with Dr. Bonilla in 6 months.     Gauri Dupree DNP, APRN-CNP  Surgical Oncology Nurse Practitioner  6/10/2025  10:50 AM

## 2025-06-06 RX ORDER — GABAPENTIN 300 MG/1
CAPSULE ORAL
COMMUNITY

## 2025-06-10 ENCOUNTER — OFFICE VISIT (OUTPATIENT)
Dept: SURGERY | Age: 80
End: 2025-06-10
Payer: MEDICARE

## 2025-06-10 VITALS
SYSTOLIC BLOOD PRESSURE: 132 MMHG | HEART RATE: 71 BPM | WEIGHT: 191 LBS | DIASTOLIC BLOOD PRESSURE: 89 MMHG | BODY MASS INDEX: 24.51 KG/M2 | TEMPERATURE: 97.8 F | HEIGHT: 74 IN

## 2025-06-10 DIAGNOSIS — C43.61 MALIGNANT MELANOMA OF RIGHT UPPER EXTREMITY INCLUDING SHOULDER (HCC): Primary | ICD-10-CM

## 2025-06-10 PROCEDURE — 1159F MED LIST DOCD IN RCRD: CPT | Performed by: NURSE PRACTITIONER

## 2025-06-10 PROCEDURE — 99213 OFFICE O/P EST LOW 20 MIN: CPT | Performed by: NURSE PRACTITIONER

## 2025-06-10 PROCEDURE — 3079F DIAST BP 80-89 MM HG: CPT | Performed by: NURSE PRACTITIONER

## 2025-06-10 PROCEDURE — 1160F RVW MEDS BY RX/DR IN RCRD: CPT | Performed by: NURSE PRACTITIONER

## 2025-06-10 PROCEDURE — 1123F ACP DISCUSS/DSCN MKR DOCD: CPT | Performed by: NURSE PRACTITIONER

## 2025-06-10 PROCEDURE — 3075F SYST BP GE 130 - 139MM HG: CPT | Performed by: NURSE PRACTITIONER

## 2025-07-22 ENCOUNTER — OFFICE VISIT (OUTPATIENT)
Age: 80
End: 2025-07-22

## 2025-07-22 DIAGNOSIS — C44.629 SQUAMOUS CELL CARCINOMA OF SKIN OF LEFT FOREARM: ICD-10-CM

## 2025-07-22 DIAGNOSIS — L57.0 ACTINIC KERATOSIS: Primary | ICD-10-CM

## 2025-07-22 NOTE — PROGRESS NOTES
[facility]  Benjamin Marina MD  [phone]      Bladimir Croft  1945    80 y.o. male     Date of Visit: 7/22/2025    Chief Complaint: skin lesions of concern    History of Present Illness:    1.  He returns today for history of actinic keratoses and reports several new lesions on the scalp and upper extremities.    2.  He also reports a new onset tender lesion on the left forearm.    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  Decision DX melanoma result with class IA signature.     Moderately differentiated SCC of the left shoulder-treated with electrodesiccation and curettage on 4/22/2025.  Focally infiltrating basal cell carcinoma of the left upper arm-treated with curettage on 4/22/2025.  Invasive moderately differentiated SCC of the left nasal tip-treated with Mohs by Dr. Hummel on 3/12/2025.  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 Hummel on 2/19/2025.  Invasive moderately differentiated SCC of the frontal scalp-treated with Mohs by Dr. Hummel on 2/19/2025.  Moderately differentiated SCC of the right distal extensor forearm-treated with electrodesiccation and curettage on 2/7/2025.  SCC in situ of the right lateral chest wall-treated with curettage on 2/7/2025.  Well-differentiated SCC on the left lateral neck-treated with Mohs by Dr. Hummel on 8/14/2024.  Well-differentiated SCC on the left upper chest-treated with electrodesiccation and curettage on 7/26/2024.  SCC in situ of the right medial lower leg-treated with curettage on 7/14/2023.  Metatypical infiltrating BCC on the left upper back-excised on 5/13/2022.  Focally infiltrative BCC on the right lower forehead-treated with Mohs by Dr. Hummel on 12/15/2021.  SCC on the left forearm-excised on

## 2025-07-25 LAB — DERMATOLOGY PATHOLOGY REPORT: ABNORMAL

## (undated) DEVICE — SHEET,T,THYROID,STERILE: Brand: MEDLINE

## (undated) DEVICE — APPLICATOR MEDICATED 26 CC SOLUTION HI LT ORNG CHLORAPREP

## (undated) DEVICE — BLADE ES ELASTOMERIC COAT INSUL DURABLE BEND UPTO 90DEG

## (undated) DEVICE — BANDAGE COBAN 4 IN COMPR W4INXL5YD FOAM COHESIVE QUIK STK SELF ADH SFT

## (undated) DEVICE — PROVE COVER: Brand: UNBRANDED

## (undated) DEVICE — 3M™ TEGADERM™ TRANSPARENT FILM DRESSING FRAME STYLE, 1626W, 4 IN X 4-3/4 IN (10 CM X 12 CM), 50/CT 4CT/CASE: Brand: 3M™ TEGADERM™

## (undated) DEVICE — TOWEL,STOP FLAG GOLD N-W: Brand: MEDLINE

## (undated) DEVICE — CLEANER,CAUTERY TIP,2X2",STERILE: Brand: MEDLINE

## (undated) DEVICE — SUTURE VCRL + SZ 3-0 L18IN ABSRB UD SH 1/2 CIR TAPERCUT NDL VCP864D

## (undated) DEVICE — SPONGE GZ W4XL8IN COT WVN 12 PLY

## (undated) DEVICE — LIQUIBAND RAPID ADHESIVE 36/CS 0.8ML: Brand: MEDLINE

## (undated) DEVICE — NEEDLE,22GX1.5",REG,BEVEL: Brand: MEDLINE

## (undated) DEVICE — PAD N ADH W3XL4IN POLY COT SFT PERF FLM EASILY CUT ABSRB

## (undated) DEVICE — GLOVE SURG SZ 7 L12IN FNGR THK79MIL GRN LTX FREE

## (undated) DEVICE — GENERAL: Brand: MEDLINE INDUSTRIES, INC.

## (undated) DEVICE — GLOVE SURG SZ 7 L12IN FNGR THK75MIL WHT LTX POLYMER BEAD

## (undated) DEVICE — SUTURE VCRL + SZ 2-0 L18IN ABSRB VLT L26MM SH 1/2 CIR VCP775D